# Patient Record
Sex: FEMALE | Race: ASIAN | NOT HISPANIC OR LATINO | Employment: STUDENT | URBAN - METROPOLITAN AREA
[De-identification: names, ages, dates, MRNs, and addresses within clinical notes are randomized per-mention and may not be internally consistent; named-entity substitution may affect disease eponyms.]

---
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
## 2021-04-12 ENCOUNTER — OFFICE VISIT - HEALTHEAST (OUTPATIENT)
Dept: UROLOGY | Facility: CLINIC | Age: 21
End: 2021-04-12

## 2021-04-12 DIAGNOSIS — R10.31 RIGHT LOWER QUADRANT ABDOMINAL PAIN: ICD-10-CM

## 2021-04-12 DIAGNOSIS — N20.1 CALCULUS OF URETER: ICD-10-CM

## 2021-04-12 DIAGNOSIS — N13.2 HYDRONEPHROSIS WITH URINARY OBSTRUCTION DUE TO URETERAL CALCULUS: ICD-10-CM

## 2021-04-12 DIAGNOSIS — N20.0 CALCULUS OF KIDNEY: ICD-10-CM

## 2021-05-10 ENCOUNTER — HOSPITAL ENCOUNTER (OUTPATIENT)
Dept: CT IMAGING | Facility: HOSPITAL | Age: 21
Discharge: HOME | End: 2021-05-10
Attending: PHYSICIAN ASSISTANT

## 2021-05-10 DIAGNOSIS — N20.1 CALCULUS OF URETER: ICD-10-CM

## 2021-05-11 ENCOUNTER — OFFICE VISIT - HEALTHEAST (OUTPATIENT)
Dept: UROLOGY | Facility: CLINIC | Age: 21
End: 2021-05-11

## 2021-05-11 DIAGNOSIS — R10.9 ACUTE RIGHT FLANK PAIN: ICD-10-CM

## 2021-05-11 DIAGNOSIS — N20.1 CALCULUS OF URETER: ICD-10-CM

## 2021-05-12 ENCOUNTER — COMMUNICATION - HEALTHEAST (OUTPATIENT)
Dept: UROLOGY | Facility: CLINIC | Age: 21
End: 2021-05-12

## 2021-05-21 ENCOUNTER — COMMUNICATION - HEALTHEAST (OUTPATIENT)
Dept: UROLOGY | Facility: CLINIC | Age: 21
End: 2021-05-21

## 2021-05-30 ENCOUNTER — RECORDS - HEALTHEAST (OUTPATIENT)
Dept: ADMINISTRATIVE | Facility: CLINIC | Age: 21
End: 2021-05-30

## 2021-06-07 ENCOUNTER — COMMUNICATION - HEALTHEAST (OUTPATIENT)
Dept: UROLOGY | Facility: CLINIC | Age: 21
End: 2021-06-07

## 2021-06-16 NOTE — PATIENT INSTRUCTIONS - HE
Patient Stated Goal: Pass my stone  Symptom Control While Passing A Stone    The goal of [facility] is to let a smaller kidney stone (less than 4 to 5 mm) pass without intervention if possible. Giving your body a chance to clear the stone may take a few hours up to a few weeks.  Keeping you well-informed, safe and fairly comfortable is important.    Drink to thirst  Do not attempt to  flush out  your stone by drinking too much fluid. This does not work and may increase nausea. Drink enough to satisfy your body s thirst. Eating your normal diet is fine.   Medications (that may be suggested or prescribed)    Ibuprofen (Advil or Motrin) Available over the counter  o Take two (200mg) tablets every six hours until the stone passes.  o Prevents spasm of the ureter.    o Decreases pain.      Dramamine* (drowsy version, non-generic formulation) Available over the counter  o Take 50mg at bedtime  o Decreases spasms of the ureter  o Decreases nausea  o Decreases acute pain  o Decreases recurrence of pain for 24 hours  o Will help you sleep  *This medication will cause increase drowsiness, do not drive or operate machinery for 6 hours.      Narcotics (Percocet, Vicodin, Dilaudid) Take as prescribed for severe pain unrelieved by ibuprofen and Dramamine  o Narcotics have significant side effects and only  cover-up  pain. They have no effect on the cause of pain.  o Common side effects  - Confusion, disorientation and sedation - DO NOT DRIVE OR OPERATE MACHINERY WITHIN 24 HOURS  - Nausea - take Dramamine or Zofran or Haldol to help control  - Constipation  - Sleep disturbances      Ondansetron (Zofran) Take as prescribed  o Reserve for severe nausea  o May cause constipation, start over the counter Miralax if needed      Second Line Anti-Nausea Medication: Adding a different anti-nausea medication maybe helpful for persistent nausea.  The combined effect of different types of anti-nausea medications maybe more  effective than either medication by itself, even in higher doses.  o Compazine: Take as prescribed      Information about kidney stones    Crystals can form if chemicals are too concentrated in your urine. If the crystal grows over time, a stone may form. A stone usually isn t painful while it is still in the kidney.    As the stone begins to leave the kidney, you may experience episodes of flank pain as the kidney stone approaches the entrance to the ureter. Some people feel a vague ache in the side.    Kidney stones may fall into the ureter. Some stones are tiny and pass through without causing symptoms. The ureter is a small tube (approximately 1/8 of an inch wide). A kidney stone can get stuck and block the ureter. If this happens, urine backs up and flows back to the kidney. Back pressure on the kidney can cause:  o Severe flank pain radiating to the groin.  o Severe nausea and vomiting.  o The pain can occur in the lower back, side, groin or all three.      When the stone reaches the lower ureter, this can irritate the bladder and sensations of feeling the urge to urinate frequently and urgently may occur.      Once the stone passes out of your ureter and into your bladder, the symptoms of urgency and frequency will often disappear. Sometimes pain will come back for a short period and will not be as severe as before. The passage of the stone from your bladder and out of your body is usually not a problem. The urethra is at least twice as wide as the normal ureter, so the stone doesn t usually block it.    Strain all urine  If you pass the stone, save it. Place it in the container we have provided and bring it to the [facility] within a week of passing it. Your stone will then be sent for analysis which takes about a month.     Signs and symptoms you might experience    Nausea    Decreased appetite    Urinary frequency    Bloody urine     Chills    Fatigue    When to call [facility] or  go to the Emergency Room    Fever with a temperature greater than 100.1    Severe pain    Persistent nausea/vomiting    If the pain worsens or nausea/vomiting is uncontrolled with medications, STOP eating & drinking. You need to have an empty stomach for 8 hours prior to surgery. Call the [facility] immediately at [phone].           Follow-up    Low dose CT scan with doctor visit 1-2 weeks after initial clinic visit per doctor s instructions    Please cancel the CT scan visit if you pass a stone. Reschedule for a one month follow-up with doctor to discuss stone composition and future prevention.    Preventing future stones    Approximately a month after your stone is sent out for analysis, a prevention visit will occur with your provider, to discuss an individualized plan for prevention of new stones and to discuss managing stones that you may still have. Along with the analysis of the kidney stone, blood and urine tests may be indicated to develop this plan. Knowing the type of kidney stones you make, and why, allows the providers at the [facility] to recommend specific ways to prevent them.    Follow-up visits are an important part of monitoring and preventing future re-occurrences.    The [facility] is available for questions or concerns 24 hours a day at [phone]

## 2021-06-16 NOTE — PROGRESS NOTES
Assessment/Plan:    Assessment & Plan   Krystyna was seen today for new problem - ed referred.    Diagnoses and all orders for this visit:    Calculus of ureter  -     Symptom Control While Passing a Stone Education  -     CT Abdomen Pelvis Without Oral Without IV Contrast; Future  -     Patient Stated Goal: Pass my stone    Right lower quadrant abdominal pain    Hydronephrosis with urinary obstruction due to ureteral calculus    Calculus of kidney        Stone Management Plan  [facility] 4/12/2021   Urinary Tract Infection No suspicion of infection   Renal Colic Well controlled symptoms   Renal Failure No suspicion of renal failure   Current CT date 4/11/2021   Right sided stones? Yes   R Number of ureteral stones 1   R GSD of ureteral stones 4   R Location of ureteral stone Proximal   R Number of kidney stones  1   R GSD of kidney stones < 2   R Hydronephrosis Mild   R Stone Event New event   Diagnosis date 4/11/2021   Initial location of primary symptomatic stone Proximal   Initial GSD of primary symptomatic stone 4   R MET status Initiation   R Current Plan MET   MET (No Data)   Left sided stones? No   L Stone Event No current event             PLAN    Will proceed with medical expulsive therapy. Risks and benefits were detailed of medical expulsive therapy including probability of stone passage, recurrent renal colic, and requirement of emergency medical and/or surgical care and further imaging. Patient verbalized understanding. Patient agrees with plan as discussed. She will return in 4 weeks with low dose CT scan.    For symptom control, she was prescribed oxycodone and ondansetron. Over the counter symptom control medications of ibuprofen, Dramamine and Tylenol were recommended.    Phone call duration: 9 minutes  16 minutes spent on the date of the encounter doing chart review, history and exam, documentation and further activities per the note    Loly Menchaca PA-C  [facility]    Subjective:      HPI  Ms. Krystyna Benavides is a 20 y.o. H[place] female who is being evaluated via a billable telephone visit by [facility] following [facility] ER visit for urolithiasis.    She is a first time unidentified composition stone former. She has no identified modifiable stone risk factors. She has identified non-modifiable stone risks including:  early age at first stone and extensive family history.    She was seen in ER yesterday for acute onset left abdominal pain x few hours in duration. The pain was constant, aching with radiation to the back and around the abdomen. It was 8/10 at its worst, Significant associated symptoms at presentation included:  shortness of breath, when pain worsened. She took \"ong medication\" with no relief. Work up was notable for CT reporting an obstructing small right ureteral stone. Labs were negative for acute issues. She was sent home with oxycodone.    She had 8/10 pain this morning. She notes feeling tired. She is asymptomatic at present. She denies symptoms of fever, chills, flank pain, nausea, vomiting, urinary frequency and dysuria.     CT scan from 4/11/21 is personally reviewed and demonstrates a < 4 mm right proximal ureteral stone with mild hydronephrosis. Additional < 2 mm right lower pole renal stone.    Significant labs from presentation include moderate hematuria, no pyuria, negative nitrite, few bacteria, normal WBC, normal creatinine and normal potassium.     ROS   A 12 point comprehensive review of systems is negative except for HPI    No past medical history on file.    No past surgical history on file.    Current Outpatient Medications   Medication Sig Dispense Refill     acetaminophen (TYLENOL) 500 MG tablet Take 2 tablets (1,000 mg total) by mouth 4 (four) times a day for 7 days. 56 tablet 0     dimenhyDRINATE (DRAMAMINE) 50 MG tablet Take 1 tablet (50 mg total) by mouth at bedtime for 7 days. 7 tablet 0     " dimenhyDRINATE (DRAMAMINE) 50 MG tablet Take 1 tablet (50 mg total) by mouth 4 (four) times a day as needed. 28 tablet 0     ibuprofen (ADVIL,MOTRIN) 200 MG tablet Take 2 tablets (400 mg total) by mouth 4 (four) times a day for 7 days. 56 tablet 0     oxyCODONE (ROXICODONE) 5 MG immediate release tablet Every 4-6 hours as needed if pain is not improved with acetaminophen and ibuprofen. 12 tablet 0     No current facility-administered medications for this visit.        No Known Allergies    Social History     Socioeconomic History     Marital status: Single     Spouse name: Not on file     Number of children: Not on file     Years of education: Not on file     Highest education level: Not on file   Occupational History     Not on file   Social Needs     Financial resource strain: Not on file     Food insecurity     Worry: Not on file     Inability: Not on file     Transportation needs     Medical: Not on file     Non-medical: Not on file   Tobacco Use     Smoking status: Not on file   Substance and Sexual Activity     Alcohol use: Not on file     Drug use: Not on file     Sexual activity: Not on file   Lifestyle     Physical activity     Days per week: Not on file     Minutes per session: Not on file     Stress: Not on file   Relationships     Social connections     Talks on phone: Not on file     Gets together: Not on file     Attends Sabianist service: Not on file     Active member of club or organization: Not on file     Attends meetings of clubs or organizations: Not on file     Relationship status: Not on file     Intimate partner violence     Fear of current or ex partner: Not on file     Emotionally abused: Not on file     Physically abused: Not on file     Forced sexual activity: Not on file   Other Topics Concern     Not on file   Social History Narrative     Not on file       No family history on file.    Objective:      No vitals or physical exam obtained due to virtual visit    Labs    Urinalysis POC  (Office):  Nitrite, UA   Date Value Ref Range Status   04/11/2021 Negative Negative Final       Lab Urinalysis:  Blood, UA   Date Value Ref Range Status   04/11/2021 >1.0 mg/dL (!) Negative Final     Nitrite, UA   Date Value Ref Range Status   04/11/2021 Negative Negative Final     Leukocytes, UA   Date Value Ref Range Status   04/11/2021 Negative Negative Final     pH, UA   Date Value Ref Range Status   04/11/2021 6.0 5.0 - 8.0 Final    and Acute Labs   CBC   WBC   Date Value Ref Range Status   04/11/2021 5.6 4.0 - 11.0 thou/uL Final   02/28/2018 11.8 4.5 - 13.0 thou/uL Final     Hemoglobin   Date Value Ref Range Status   04/11/2021 14.0 12.0 - 16.0 g/dL Final   02/28/2018 13.3 12.0 - 16.0 g/dL Final     Platelets   Date Value Ref Range Status   04/11/2021 252 140 - 440 thou/uL Final   02/28/2018 275 140 - 440 thou/uL Final    and Renal Panel  [facility]  Creatinine   Date Value Ref Range Status   04/11/2021 0.64 0.60 - 1.10 mg/dL Final     Potassium   Date Value Ref Range Status   04/11/2021 4.0 3.5 - 5.0 mmol/L Final     Calcium   Date Value Ref Range Status   04/11/2021 9.8 8.5 - 10.5 mg/dL Final

## 2021-06-17 ENCOUNTER — COMMUNICATION - HEALTHEAST (OUTPATIENT)
Dept: UROLOGY | Facility: CLINIC | Age: 21
End: 2021-06-17

## 2021-06-17 NOTE — PROGRESS NOTES
Assessment/Plan:    Assessment & Plan   Krystyna was seen today for met follow up.    Diagnoses and all orders for this visit:    Calculus of ureter  -     Symptom Control While Passing a Stone Education  -     CT Abdomen Pelvis Without Oral Without IV Contrast; Future  -     Patient Stated Goal: Pass my stone    Acute right flank pain    Stone Management Plan  [facility] 4/12/2021   Urinary Tract Infection No suspicion of infection   Renal Colic Well controlled symptoms   Renal Failure No suspicion of renal failure   Current CT date 4/11/2021   Right sided stones? Yes   R Number of ureteral stones 1   R GSD of ureteral stones 4   R Location of ureteral stone Proximal   R Number of kidney stones  1   R GSD of kidney stones < 2   R Hydronephrosis Mild   R Stone Event New event   Diagnosis date 4/11/2021   Initial location of primary symptomatic stone Proximal   Initial GSD of primary symptomatic stone 4   R MET status Initiation   R Current Plan MET   MET (No Data)   Left sided stones? No   L Stone Event No current event         PLAN    19 yo H[place] F first time stone former with progression of right ureteral stone, now x 2.     She will continue to attempt to pass stone and will return in 4 weeks with further imaging.     Phone call duration: 7 minutes  12 minutes spent on the date of the encounter doing chart review, history and exam, documentation and further activities per the note    Loly Menchaca PA-C  [facility]    Subjective:      HPI  Ms. Krystyna Benavides is a 20 y.o. [place] female who is being evaluated via a billable telephone visit by [facility] for medical expulsive therapy follow up.     On last encounter, her 4 mm stone was in right proximal ureter with mild hydronephrosis. She has had no unanticipated events.    She has had mild, intermittent pain in the right flank and abdomen. She is asymptomatic at present. She denies symptoms of  fever, chills, flank pain, nausea, vomiting, urinary frequency and dysuria.     New CT scan was personally reviewed and demonstrates progression of 4 mm stone into mid ureter. Additionally, previous 2 mm right lower pole stone has migrated into ureter and sitting ~ 2 cm proximal to pre-existing ureteral stone. No hydronephrosis.     ROS   Review of systems is negative except for HPI.    No past medical history on file.    No past surgical history on file.    No current outpatient medications on file.     No current facility-administered medications for this visit.        No Known Allergies    Social History     Socioeconomic History     Marital status: Single     Spouse name: Not on file     Number of children: Not on file     Years of education: Not on file     Highest education level: Not on file   Occupational History     Not on file   Social Needs     Financial resource strain: Not on file     Food insecurity     Worry: Not on file     Inability: Not on file     Transportation needs     Medical: Not on file     Non-medical: Not on file   Tobacco Use     Smoking status: Not on file   Substance and Sexual Activity     Alcohol use: Not on file     Drug use: Not on file     Sexual activity: Not on file   Lifestyle     Physical activity     Days per week: Not on file     Minutes per session: Not on file     Stress: Not on file   Relationships     Social connections     Talks on phone: Not on file     Gets together: Not on file     Attends Confucianist service: Not on file     Active member of club or organization: Not on file     Attends meetings of clubs or organizations: Not on file     Relationship status: Not on file     Intimate partner violence     Fear of current or ex partner: Not on file     Emotionally abused: Not on file     Physically abused: Not on file     Forced sexual activity: Not on file   Other Topics Concern     Not on file   Social History Narrative     Not on file       No family history on  file.    Objective:      No vitals or physical exam obtained due to virtual visit

## 2021-06-17 NOTE — PROGRESS NOTES
Patient roomed via telephone for a virtual visit.  She is being seen for a 4 weeks ureteral stone follow up with a CT scan.  Patient confirmed she is in the [facility] at the time of this appointment.

## 2021-06-17 NOTE — PATIENT INSTRUCTIONS - HE
Patient Stated Goal: Pass my stone  Symptom Control While Passing A Stone    The goal of [facility] is to let a smaller kidney stone (less than 4 to 5 mm) pass without intervention if possible. Giving your body a chance to clear the stone may take a few hours up to a few weeks.  Keeping you well-informed, safe and fairly comfortable is important.    Drink to thirst  Do not attempt to  flush out  your stone by drinking too much fluid. This does not work and may increase nausea. Drink enough to satisfy your body s thirst. Eating your normal diet is fine.   Medications (that may be suggested or prescribed)    Ibuprofen (Advil or Motrin) Available over the counter  o Take two (200mg) tablets every six hours until the stone passes.  o Prevents spasm of the ureter.    o Decreases pain.      Dramamine* (drowsy version, non-generic formulation) Available over the counter  o Take 50mg at bedtime  o Decreases spasms of the ureter  o Decreases nausea  o Decreases acute pain  o Decreases recurrence of pain for 24 hours  o Will help you sleep  *This medication will cause increase drowsiness, do not drive or operate machinery for 6 hours.      Narcotics (Percocet, Vicodin, Dilaudid) Take as prescribed for severe pain unrelieved by ibuprofen and Dramamine  o Narcotics have significant side effects and only  cover-up  pain. They have no effect on the cause of pain.  o Common side effects  - Confusion, disorientation and sedation - DO NOT DRIVE OR OPERATE MACHINERY WITHIN 24 HOURS  - Nausea - take Dramamine or Zofran or Haldol to help control  - Constipation  - Sleep disturbances      Ondansetron (Zofran) Take as prescribed  o Reserve for severe nausea  o May cause constipation, start over the counter Miralax if needed      Second Line Anti-Nausea Medication: Adding a different anti-nausea medication maybe helpful for persistent nausea.  The combined effect of different types of anti-nausea medications maybe more  effective than either medication by itself, even in higher doses.  o Compazine: Take as prescribed      Information about kidney stones    Crystals can form if chemicals are too concentrated in your urine. If the crystal grows over time, a stone may form. A stone usually isn t painful while it is still in the kidney.    As the stone begins to leave the kidney, you may experience episodes of flank pain as the kidney stone approaches the entrance to the ureter. Some people feel a vague ache in the side.    Kidney stones may fall into the ureter. Some stones are tiny and pass through without causing symptoms. The ureter is a small tube (approximately 1/8 of an inch wide). A kidney stone can get stuck and block the ureter. If this happens, urine backs up and flows back to the kidney. Back pressure on the kidney can cause:  o Severe flank pain radiating to the groin.  o Severe nausea and vomiting.  o The pain can occur in the lower back, side, groin or all three.      When the stone reaches the lower ureter, this can irritate the bladder and sensations of feeling the urge to urinate frequently and urgently may occur.      Once the stone passes out of your ureter and into your bladder, the symptoms of urgency and frequency will often disappear. Sometimes pain will come back for a short period and will not be as severe as before. The passage of the stone from your bladder and out of your body is usually not a problem. The urethra is at least twice as wide as the normal ureter, so the stone doesn t usually block it.    Strain all urine  If you pass the stone, save it. Place it in the container we have provided and bring it to the [facility] within a week of passing it. Your stone will then be sent for analysis which takes about a month.     Signs and symptoms you might experience    Nausea    Decreased appetite    Urinary frequency    Bloody urine     Chills    Fatigue    When to call [facility] or  go to the Emergency Room    Fever with a temperature greater than 100.1    Severe pain    Persistent nausea/vomiting    If the pain worsens or nausea/vomiting is uncontrolled with medications, STOP eating & drinking. You need to have an empty stomach for 8 hours prior to surgery. Call the [facility] immediately at [phone].           Follow-up    Low dose CT scan with doctor visit 1-2 weeks after initial clinic visit per doctor s instructions    Please cancel the CT scan visit if you pass a stone. Reschedule for a one month follow-up with doctor to discuss stone composition and future prevention.    Preventing future stones    Approximately a month after your stone is sent out for analysis, a prevention visit will occur with your provider, to discuss an individualized plan for prevention of new stones and to discuss managing stones that you may still have. Along with the analysis of the kidney stone, blood and urine tests may be indicated to develop this plan. Knowing the type of kidney stones you make, and why, allows the providers at the [facility] to recommend specific ways to prevent them.    Follow-up visits are an important part of monitoring and preventing future re-occurrences.    The [facility] is available for questions or concerns 24 hours a day at [phone]

## 2021-06-18 ENCOUNTER — COMMUNICATION - HEALTHEAST (OUTPATIENT)
Dept: UROLOGY | Facility: CLINIC | Age: 21
End: 2021-06-18

## 2021-06-26 NOTE — TELEPHONE ENCOUNTER
PT CALLED BACK TO TELL US THAT SHE PASSED HER STONE AND IS NOT INTERESTED AT THIS TIME TO F/U. TOLD THE PT THAT SHOULD SHE HAVE ISSUES IN THE FUTURE TO CALL.

## 2022-03-30 ENCOUNTER — OFFICE VISIT (OUTPATIENT)
Dept: FAMILY MEDICINE | Facility: CLINIC | Age: 22
End: 2022-03-30
Payer: COMMERCIAL

## 2022-03-30 VITALS
OXYGEN SATURATION: 97 % | BODY MASS INDEX: 32.82 KG/M2 | TEMPERATURE: 97.9 F | SYSTOLIC BLOOD PRESSURE: 114 MMHG | WEIGHT: 172.3 LBS | HEART RATE: 72 BPM | DIASTOLIC BLOOD PRESSURE: 75 MMHG | RESPIRATION RATE: 20 BRPM

## 2022-03-30 DIAGNOSIS — H10.9 BACTERIAL CONJUNCTIVITIS OF LEFT EYE: ICD-10-CM

## 2022-03-30 DIAGNOSIS — F32.9 MAJOR DEPRESSIVE DISORDER WITH CURRENT ACTIVE EPISODE, UNSPECIFIED DEPRESSION EPISODE SEVERITY, UNSPECIFIED WHETHER RECURRENT: Primary | ICD-10-CM

## 2022-03-30 PROCEDURE — 99203 OFFICE O/P NEW LOW 30 MIN: CPT | Performed by: FAMILY MEDICINE

## 2022-03-30 RX ORDER — POLYMYXIN B SULFATE AND TRIMETHOPRIM 1; 10000 MG/ML; [USP'U]/ML
1-2 SOLUTION OPHTHALMIC EVERY 4 HOURS
Qty: 10 ML | Refills: 0 | Status: SHIPPED | OUTPATIENT
Start: 2022-03-30

## 2022-03-30 NOTE — PROGRESS NOTES
Assessment:       Bacterial conjunctivitis of left eye  - trimethoprim-polymyxin b (POLYTRIM) 77383-7.1 UNIT/ML-% ophthalmic solution  Dispense: 10 mL; Refill: 0    Major depressive disorder with current active episode, unspecified depression episode severity, unspecified whether recurrent  - Adult Mental [facility]  Referral         Plan:     Symptoms consistent with bacterial conjunctivitis of the left eye.  Polytrim ophthalmic drops prescribed.  Recommend warm compresses.  Follow-up if symptoms getting worse or not improving.    Patient seems to be struggling with symptoms consistent with major depressive disorder.  Mental health referral placed for counseling.  She does have an appointment in 2 days to establish care with a new PCP as well as follow-up of her mental health advised her to keep this appointment.  May need medication management but will defer to primary care for this.  No current thoughts of self-harm.  Patient is agreeable with this plan.    MEDICATIONS:   Orders Placed This Encounter   Medications     trimethoprim-polymyxin b (POLYTRIM) 29894-1.1 UNIT/ML-% ophthalmic solution     Sig: Place 1-2 drops Into the left eye every 4 hours     Dispense:  10 mL     Refill:  0         Subjective:       21 year old female presents for evaluation of a couple day history of itchy irritated and red left eye with purulent drainage and some crusting at the base of her eyelashes.  For the past couple of years she has had issues with bilateral watery eyes.  She denies any eye pain.  No significant changes in her visual acuity.  Denies foreign body sensation or trauma.    Patient is also been feeling like her moods have been very low for the past year or so.  She finds herself unmotivated to get things done and has a hard time focusing at work.  She wants to sleep all day.  She has fleeting thoughts of self-harm but currently feels okay in this regard.  Her mother  last August and she has really struggled  with the grief of this.  Is an appointment in 2 days to discuss these issues with primary care.  She is wondering about a referral to therapy.        Patient Active Problem List   Diagnosis     Major depressive disorder with current active episode, unspecified depression episode severity, unspecified whether recurrent       No past medical history on file.    Past Surgical History:   Procedure Laterality Date     NO HISTORY OF SURGERY         Current Outpatient Medications   Medication     ibuprofen (ADVIL/MOTRIN) 600 MG tablet     trimethoprim-polymyxin b (POLYTRIM) 31145-1.1 UNIT/ML-% ophthalmic solution     Acetaminophen (TYLENOL PO)     cetirizine (ZYRTEC) 10 MG tablet     No current facility-administered medications for this visit.       No Known Allergies    Family History   Problem Relation Age of Onset     Diabetes Mother      Hypertension Mother      Asthma Father      Family History Negative Other        Social History     Socioeconomic History     Marital status: Single     Spouse name: None     Number of children: None     Years of education: None     Highest education level: None   Occupational History     None   Tobacco Use     Smoking status: Current Some Day Smoker     Smokeless tobacco: Current User     Tobacco comment: smokes vape   Substance and Sexual Activity     Alcohol use: None     Drug use: None     Sexual activity: Never     Partners: Male     Birth control/protection: Pill   Other Topics Concern     None   Social History Narrative     None     Social Determinants of Health     Financial Resource Strain: Not on file   Food Insecurity: Not on file   Transportation Needs: Not on file   Physical Activity: Not on file   Stress: Not on file   Social Connections: Not on file   Intimate Partner Violence: Not on file   Housing Stability: Not on file         Review of Systems  Pertinent items are noted in HPI.      Objective:     /75 (BP Location: Right arm, Patient Position: Sitting, Cuff  Size: Adult Regular)   Pulse 72   Temp 97.9  F (36.6  C) (Oral)   Resp 20   Wt 78.2 kg (172 lb 4.8 oz)   LMP 10/13/2021 (Approximate)   SpO2 97%   Breastfeeding No   BMI 32.82 kg/m       General appearance: Alert, patient tearful.  Affect somewhat flat.  Eyes: Patient with some yellowish drainage coming from the left eye.  Conjunctiva is erythematous and injected.  No foreign body seen.  She has some crusting noted at the base of her eyelashes.  Right eye is unaffected.  Lungs: clear to auscultation bilaterally  Heart: Regular rate and rhythm          This note has been dictated using voice recognition software. Any grammatical or context distortions are unintentional and inherent to the software

## 2022-03-30 NOTE — PATIENT INSTRUCTIONS
Patient Education     Bacterial Conjunctivitis    You have an infection in the membranes covering the white part of the eye. This part of the eye is called the conjunctiva. The infection is called conjunctivitis. The most common symptoms of conjunctivitis include a thick, pus-like discharge from the eye, swollen eyelids, redness, eyelids sticking together upon awakening, and a gritty or scratchy feeling in the eye. Your infection was caused by bacteria. It may be treated with medicine. With treatment, the infection takes about 7 to 10 days to resolve.   Home care    Use prescribed antibiotic eye drops or ointment as directed to treat the infection.    Apply a warm compress (towel soaked in warm water) to the affected eye 3 to 4 times a day. Do this just before applying medicine to the eye.    Use a warm, wet cloth to wipe away crusting of the eyelids in the morning. This is caused by mucus drainage during the night. You may also use saline irrigating solution or artificial tears to rinse away mucus in the eye. Do not put a patch over the eye.    Wash your hands before and after touching the infected eye. This is to prevent spreading the infection to the other eye, and to other people. Don't share your towels or washcloths with others.    You may use acetaminophen or ibuprofen to control pain, unless another medicine was prescribed. Talk with your healthcare provider before using these medicines if you have chronic liver or kidney disease. Also talk with your provider if you have ever had a stomach ulcer or digestive bleeding.    Don't wear contact lenses until your eyes have healed and all symptoms are gone.    Follow-up care  Follow up with your healthcare provider, or as advised.  When to seek medical advice  Call your healthcare provider right away if any of these occur:    Worsening vision    Increasing pain in the eye    Increasing swelling or redness of the eyelid    Redness spreading around the  eye  Wero last reviewed this educational content on 2020-2021 [employer]. All rights reserved. This information is not intended as a substitute for professional medical care. Always follow your healthcare professional's instructions.           Patient Education     Know the Symptoms of Depression  Everyone feels down at times. The blues are a natural part of life. But an unhappy period that s intense or lasts for more than a few weeks is different. It can be a sign of depression. Depression is a serious illness. It's not a sign of weakness. It's not a character flaw. And it's not something you can just snap out of. Most people with depression need treatment to get better. Depression can disrupt the lives of family and friends. If you know someone who may be depressed, find out what you can do to help.    Symptoms of depression  People who are depressed may:    Feel unhappy, sad, blue, down, or miserable almost every day    Feel helpless, hopeless, or worthless    Lose interest in hobbies, friends, and activities that used to give pleasure    Not sleep well or sleep too much    Gain or lose weight    Feel low on energy or always tired    Have a hard time focusing or making decisions    Lose interest in sex    Have physical symptoms, such as stomachaches, headaches, or backaches  Know the serious signs  Never ignore a person's comments about suicide. Or behaviors that can lead to self-harm. Warning signs for suicide include:    Threats or talk of suicide. Talk of harming themselves or others.    Saying things such as  I won t be a problem much longer  or  Nothing matters.     Giving away their things. Or making a will or  plans.    Buying a gun or other weapon.    Sudden, unexplained cheerfulness or calm after a period of depression.  If you see any of these signs, get help right away. Call a healthcare provider, mental health clinic, or suicide hotline. Ask what you should do. In  an emergency, call 911.  Resources:    National Institutes of Mental Qtdqvn154-910-5872ach.[facility].nih.gov    National [place] on Mental Gcekelh261-978-1571yxc.rosita.org     Mental Health Crkpvkf424-861-2462qar.[facility].org    National Suicide Bshxapd595-377-1420 (800-SUICIDE)    National Suicide Prevention Pgeqrpgt218-201-4723 (793-876-TGKU)www.suicidepreventionlifeline.org    Wero last reviewed this educational content on 4/1/2019 2000-2021 [employer]. All rights reserved. This information is not intended as a substitute for professional medical care. Always follow your healthcare professional's instructions.

## 2022-03-30 NOTE — LETTER
March 30, 2022      Krystyna Benavides  [address] [place] [address]        To Whom It May Concern:    Krystyna Benavides  was seen on 3/30/2022.  Please excuse her 3/30/2022 due to  illness.        Sincerely,        Dariana Flores MD

## 2022-03-31 ENCOUNTER — TELEPHONE (OUTPATIENT)
Dept: BEHAVIORAL HEALTH | Facility: CLINIC | Age: 22
End: 2022-03-31
Payer: COMMERCIAL

## 2022-03-31 NOTE — TELEPHONE ENCOUNTER
Reached patient, said cannot talk and will call back later.     Barbara Wan  Transition Clinic Coordinator  Date and Time: 03/31/22 3:15 PM      ----- Message from Pal Fernando sent at 3/31/2022 11:52 AM CDT -----  Regarding: transition clinic referral  Transition Clinic Referral   [place] Only   Limited [place] Availability    Type of Referral:      __X__Therap  _____Therapy & Medication (Psychiatry next level of care appointment needs to be scheduled)  _____Medication Only (Psychiatry next level of care appointment needs to be scheduled)  _____Diagnostic Assessment Only      Referring Provider Name: Pal Fernando    Clinician completing the assessment.     Referring Provider: OTHER: OP Intake    If known, referring provider Phone Number: [phone]    Reason for Transition Clinic Referral: Pt has a referral for therapy. Please help bridge the wait until they can be seen. Thanks!    Next Level of Care Patient Will Be Transitioned To: Nat Vergara [facility] Department: [facility] ADDICTION     Start Date for Next Level of Care Therapy (Required): 8/29/22  Provider Nat Vergara [facility]   [facility]     Start Date for Next Level of Care Medication (Required): N/A  Provider  Location   Psychiatry cannot see patients who do not have active medical insurance    What Would Be Helpful from the Transition Clinic: Pt has a referral for therapy. Please help bridge the wait until they can be seen. Thanks! Pt might prefer openings after 4pm if possible.      Needs: NO    Does Patient Have Access to Technology: yes    Patient E-mail Address: [email or web address]    Current Patient Phone Number: [phone];     Clinician Gender Preference (if applicable): NO    Pal Fernando

## 2022-04-01 ENCOUNTER — TELEPHONE (OUTPATIENT)
Dept: BEHAVIORAL HEALTH | Facility: CLINIC | Age: 22
End: 2022-04-01

## 2022-04-01 ENCOUNTER — OFFICE VISIT (OUTPATIENT)
Dept: FAMILY MEDICINE | Facility: CLINIC | Age: 22
End: 2022-04-01
Payer: COMMERCIAL

## 2022-04-01 VITALS
OXYGEN SATURATION: 97 % | WEIGHT: 174 LBS | RESPIRATION RATE: 18 BRPM | SYSTOLIC BLOOD PRESSURE: 98 MMHG | DIASTOLIC BLOOD PRESSURE: 58 MMHG | TEMPERATURE: 98.5 F | HEART RATE: 86 BPM | BODY MASS INDEX: 32.85 KG/M2 | HEIGHT: 61 IN

## 2022-04-01 DIAGNOSIS — N92.6 IRREGULAR PERIODS: ICD-10-CM

## 2022-04-01 DIAGNOSIS — Z00.00 ROUTINE GENERAL MEDICAL EXAMINATION AT A HEALTH CARE FACILITY: Primary | ICD-10-CM

## 2022-04-01 LAB
ALBUMIN SERPL-MCNC: 3.9 G/DL (ref 3.5–5)
ALP SERPL-CCNC: 90 U/L (ref 45–120)
ALT SERPL W P-5'-P-CCNC: 23 U/L (ref 0–45)
ANION GAP SERPL CALCULATED.3IONS-SCNC: 10 MMOL/L (ref 5–18)
AST SERPL W P-5'-P-CCNC: 16 U/L (ref 0–40)
BILIRUB SERPL-MCNC: 0.2 MG/DL (ref 0–1)
BUN SERPL-MCNC: 12 MG/DL (ref 8–22)
CALCIUM SERPL-MCNC: 10.9 MG/DL (ref 8.5–10.5)
CHLORIDE BLD-SCNC: 107 MMOL/L (ref 98–107)
CHOLEST SERPL-MCNC: 235 MG/DL
CO2 SERPL-SCNC: 26 MMOL/L (ref 22–31)
CREAT SERPL-MCNC: 0.82 MG/DL (ref 0.6–1.1)
ERYTHROCYTE [DISTWIDTH] IN BLOOD BY AUTOMATED COUNT: 11.8 % (ref 10–15)
FASTING STATUS PATIENT QL REPORTED: NO
GFR SERPL CREATININE-BSD FRML MDRD: >90 ML/MIN/1.73M2
GLUCOSE BLD-MCNC: 103 MG/DL (ref 70–125)
HCT VFR BLD AUTO: 42.4 % (ref 35–47)
HDLC SERPL-MCNC: 52 MG/DL
HGB BLD-MCNC: 13.8 G/DL (ref 11.7–15.7)
LDLC SERPL CALC-MCNC: 118 MG/DL
MCH RBC QN AUTO: 29.7 PG (ref 26.5–33)
MCHC RBC AUTO-ENTMCNC: 32.5 G/DL (ref 31.5–36.5)
MCV RBC AUTO: 91 FL (ref 78–100)
PLATELET # BLD AUTO: 304 10E3/UL (ref 150–450)
POTASSIUM BLD-SCNC: 4.7 MMOL/L (ref 3.5–5)
PROT SERPL-MCNC: 7.5 G/DL (ref 6–8)
RBC # BLD AUTO: 4.64 10E6/UL (ref 3.8–5.2)
SODIUM SERPL-SCNC: 143 MMOL/L (ref 136–145)
TRIGL SERPL-MCNC: 323 MG/DL
TSH SERPL DL<=0.005 MIU/L-ACNC: 2.1 UIU/ML (ref 0.3–5)
WBC # BLD AUTO: 12.4 10E3/UL (ref 4–11)

## 2022-04-01 PROCEDURE — 80053 COMPREHEN METABOLIC PANEL: CPT | Performed by: FAMILY MEDICINE

## 2022-04-01 PROCEDURE — 99395 PREV VISIT EST AGE 18-39: CPT | Performed by: FAMILY MEDICINE

## 2022-04-01 PROCEDURE — 80061 LIPID PANEL: CPT | Performed by: FAMILY MEDICINE

## 2022-04-01 PROCEDURE — 84443 ASSAY THYROID STIM HORMONE: CPT | Performed by: FAMILY MEDICINE

## 2022-04-01 PROCEDURE — 36415 COLL VENOUS BLD VENIPUNCTURE: CPT | Performed by: FAMILY MEDICINE

## 2022-04-01 PROCEDURE — 99213 OFFICE O/P EST LOW 20 MIN: CPT | Mod: 25 | Performed by: FAMILY MEDICINE

## 2022-04-01 PROCEDURE — 85027 COMPLETE CBC AUTOMATED: CPT | Performed by: FAMILY MEDICINE

## 2022-04-01 ASSESSMENT — PATIENT HEALTH QUESTIONNAIRE - PHQ9
10. IF YOU CHECKED OFF ANY PROBLEMS, HOW DIFFICULT HAVE THESE PROBLEMS MADE IT FOR YOU TO DO YOUR WORK, TAKE CARE OF THINGS AT HOME, OR GET ALONG WITH OTHER PEOPLE: EXTREMELY DIFFICULT
SUM OF ALL RESPONSES TO PHQ QUESTIONS 1-9: 15
SUM OF ALL RESPONSES TO PHQ QUESTIONS 1-9: 15

## 2022-04-01 NOTE — PROGRESS NOTES
SUBJECTIVE:   CC: Krystyna Benavides is an 21 year old woman who presents for preventive health visit.       Patient has been advised of split billing requirements and indicates understanding: Yes     Healthy Habits:     Getting at least 3 servings of Calcium per day:  NO    Bi-annual eye exam:  Yes    Dental care twice a year:  NO    Sleep apnea or symptoms of sleep apnea:  Daytime drowsiness    Diet:  Regular (no restrictions)    Frequency of exercise:  4-5 days/week    Duration of exercise:  Greater than 60 minutes    Taking medications regularly:  Yes    Medication side effects:  None    PHQ-2 Total Score: 3    Additional concerns today:  Yes        PROBLEMS TO ADD ON...  Period is not regular for quite a while,   I have it about every several months, my nipples hurt and I have a strange smelling dischrage down there.    Patient states that once in a while she will get a very hsrp pain in her chest area and pause what she is doing and focus on breathing.       Today's PHQ-2 Score:   PHQ-2 ( 1999 [employer]) 4/1/2022   Q1: Little interest or pleasure in doing things 2   Q2: Feeling down, depressed or hopeless 2   PHQ-2 Score 4   Q1: Little interest or pleasure in doing things More than half the days   Q2: Feeling down, depressed or hopeless More than half the days   PHQ-2 Score 4       Abuse: Current or Past (Physical, Sexual or Emotional) - No  Do you feel safe in your environment? Yes    Have you ever done Advance Care Planning? (For example, a Health Directive, POLST, or a discussion with a medical provider or your loved ones about your wishes): No, advance care planning information given to patient to review.  Advanced care planning was discussed at today's visit.    Social History     Tobacco Use     Smoking status: Current Some Day Smoker     Smokeless tobacco: Current User     Tobacco comment: smokes vape   Substance Use Topics     Alcohol use: Not on file       Alcohol Use 4/1/2022   Prescreen: >3 drinks/day or >7  "drinks/week? No     Reviewed orders with patient.  Reviewed health maintenance and updated orders accordingly - Yes  Lab work is in process  Labs reviewed in EPIC         Reviewed and updated as needed this visit by clinical staff   Tobacco  Allergies  Meds              Reviewed and updated as needed this visit by Provider                     Review of Systems       OBJECTIVE:   BP 98/58   Pulse 86   Temp 98.5  F (36.9  C)   Resp 18   Ht 1.543 m (5' 0.75\")   Wt 78.9 kg (174 lb)   SpO2 97%   BMI 33.15 kg/m    Physical Exam  GENERAL: healthy, alert and no distress  EYES: Eyes grossly normal to inspection, PERRL and conjunctivae and sclerae normal  HENT: normal cephalic/atraumatic, nose and mouth without ulcers or lesions, oropharynx clear and oral mucous membranes moist  NECK: no adenopathy, no asymmetry, masses, or scars and thyroid normal to palpation  RESP: lungs clear to auscultation - no rales, rhonchi or wheezes  CV: regular rate and rhythm, normal S1 S2, no S3 or S4, no murmur, click or rub, no peripheral edema and peripheral pulses strong  ABDOMEN: soft, nontender, no hepatosplenomegaly, no masses and bowel sounds normal   (female): deferred to Gyn.   MS: no gross musculoskeletal defects noted, no edema  SKIN: no suspicious lesions or rashes  NEURO: Normal strength and tone, mentation intact and speech normal  PSYCH: mentation appears normal, affect normal/bright    Diagnostic Test Results:  Labs reviewed in Epic    ASSESSMENT/PLAN:     Problem List Items Addressed This Visit     None      Visit Diagnoses     Routine general medical examination at a health care facility    -  Primary    Relevant Orders    TSH with free T4 reflex (Completed)    Comprehensive metabolic panel (BMP + Alb, Alk Phos, ALT, AST, Total. Bili, TP) (Completed)    Lipid panel reflex to direct LDL Fasting (Completed)    CBC with platelets (Completed)    See result notes       Irregular periods        Relevant Orders    Ob/Gyn " "Referral          Patient has been advised of split billing requirements and indicates understanding: Yes    COUNSELING:  Reviewed preventive health counseling, as reflected in patient instructions       Regular exercise       Healthy diet/nutrition    Estimated body mass index is 33.15 kg/m  as calculated from the following:    Height as of this encounter: 1.543 m (5' 0.75\").    Weight as of this encounter: 78.9 kg (174 lb).      She reports that she has been smoking. She uses smokeless tobacco.  Tobacco Cessation Action Plan:   Self help information given to patient      Counseling Resources:  ATP IV Guidelines  Pooled Cohorts Equation Calculator  Breast Cancer Risk Calculator  BRCA-Related Cancer Risk Assessment: FHS-7 Tool  FRAX Risk Assessment  ICSI Preventive Guidelines  Dietary Guidelines for Americans, 2010  [employer]'s MyPlate  ASA Prophylaxis  Lung CA Screening    Hannah Donis MD  [facility]  Answers for HPI/ROS submitted by the patient on 4/1/2022  If you checked off any problems, how difficult have these problems made it for you to do your work, take care of things at home, or get along with other people?: Extremely difficult  PHQ9 TOTAL SCORE: 15      "

## 2022-04-01 NOTE — TELEPHONE ENCOUNTER
Transition Clinic Note       2nd attempt: Follow up check in for scheduling needs with pt who was referred from PCP. No answer on the line, voicemail indicating TC call back and 20lines message with resources    Thank you for your interest in [facility]. Currently, patients are experiencing long waits for intake when referred within [facility]. Please know that you may contact your insurance carrier member services to learn more about scheduling in network therapy. Your insurance company will have lists of in network therapists that are not within [facility] and may have more immediate availability.     Get Care started with an ongoing therapist by calling to schedule your intake at one of the following clinics:  [facility]  [phone]  [facility] [phone]  [facility] [phone]  [facility] [phone]  [facility]  [phone]   [facility] Solution in [facility] [phone]    You may contact the Transition Clinic for brief short term support with your mental health goals. Call [phone] for more information.             ----- Message from Pal Fernando sent at 3/31/2022 11:52 AM CDT -----  Regarding: transition clinic referral  Transition Clinic Referral   [place] Only   Limited [place] Availability    Type of Referral:      __X__Therap  _____Therapy & Medication (Psychiatry next level of care appointment needs to be scheduled)  _____Medication Only (Psychiatry next level of care appointment needs to be scheduled)  _____Diagnostic Assessment Only      Referring Provider Name: Pal Fernando    Clinician completing the assessment.     Referring Provider: OTHER: OP Intake    If known, referring provider Phone Number: [phone]    Reason for Transition Clinic Referral: Pt has a referral for therapy. Please help bridge the wait until they can be seen. Thanks!    Next Level of Care Patient Will Be Transitioned To: Ursula  Nat Zuleta [facility]     Start Date for Next Level of Care Therapy (Required): 8/29/22  Provider Nat Vergara [facility]   Location Department: [facility] ADDICTION     Start Date for Next Level of Care Medication (Required): N/A  Provider  Location   Psychiatry cannot see patients who do not have active medical insurance    What Would Be Helpful from the Transition Clinic: Pt has a referral for therapy. Please help bridge the wait until they can be seen. Thanks! Pt might prefer openings after 4pm if possible.      Needs: NO    Does Patient Have Access to Technology: yes    Patient E-mail Address: [email or web address]    Current Patient Phone Number: [phone];     Clinician Gender Preference (if applicable): NO    Pal Fernando

## 2022-04-01 NOTE — TELEPHONE ENCOUNTER
Pt called the queue to set up therapy. Appoint[facility] for [facility]18 [facility] 5:30pm [facility]  Care Coordinator  4.1

## 2022-04-03 ENCOUNTER — TELEPHONE (OUTPATIENT)
Dept: BEHAVIORAL HEALTH | Facility: CLINIC | Age: 22
End: 2022-04-03
Payer: COMMERCIAL

## 2022-04-03 NOTE — TELEPHONE ENCOUNTER
Appointment with TC was already scheduled on 4.1. Writer notified referral source and done'd referral.     [facility]  [facility] Coordinator  4.3    ----- Message from Pal Fernando sent at 3/31/2022 11:52 AM CDT -----  Regarding: transition clinic referral  Transition Clinic Referral   [place] Only   Limited [place] Availability    Type of Referral:      __X__Therap  _____Therapy & Medication (Psychiatry next level of care appointment needs to be scheduled)  _____Medication Only (Psychiatry next level of care appointment needs to be scheduled)  _____Diagnostic Assessment Only      Referring Provider Name: Pal Fernando    Clinician completing the assessment.     Referring Provider: OTHER: OP Intake    If known, referring provider Phone Number: [phone]    Reason for Transition Clinic Referral: Pt has a referral for therapy. Please help bridge the wait until they can be seen. Thanks!    Next Level of Care Patient Will Be Transitioned To: [facility]     Start Date for Next Level of Care Therapy (Required): 8/29/22  Provider aNt [facility]   [facility]     Start Date for Next Level of Care Medication (Required): N/A  Provider  Location   Psychiatry cannot see patients who do not have active medical insurance    What Would Be Helpful from the Transition Clinic: Pt has a referral for therapy. Please help bridge the wait until they can be seen. Thanks! Pt might prefer openings after 4pm if possible.      Needs: NO    Does Patient Have Access to Technology: yes    Patient E-mail Address: [email or web address]    Current Patient Phone Number: [phone];     Clinician Gender Preference (if applicable): NO    Pal Fernando

## 2022-04-13 ENCOUNTER — TELEPHONE (OUTPATIENT)
Dept: BEHAVIORAL HEALTH | Facility: CLINIC | Age: 22
End: 2022-04-13
Payer: COMMERCIAL

## 2022-04-13 NOTE — TELEPHONE ENCOUNTER
Writer left  requesting pt to call back to reschedule 4/18 Monday appt to different day.    [facility]  Care Coordinator  4.13

## 2022-04-20 ENCOUNTER — VIRTUAL VISIT (OUTPATIENT)
Dept: BEHAVIORAL HEALTH | Facility: CLINIC | Age: 22
End: 2022-04-20
Attending: PSYCHIATRY & NEUROLOGY
Payer: COMMERCIAL

## 2022-04-20 DIAGNOSIS — F32.A DEPRESSION: Primary | ICD-10-CM

## 2022-04-20 NOTE — PROGRESS NOTES
[facility]   Mental Health & Addiction Services     Progress Note - Initial Visit    Patient  Name:  Krystyna Benavides Date: 22          Service Type: Individual     Visit Start Time: 5:30 PM  Visit End Time: 6:33 PM    Visit #: 1    Attendees: Client attended alone    Service Modality:  Video Visit:      Provider verified identity through the following two step process.  Patient provided:  Patient  and Patient address    Telemedicine Visit: The patient's condition can be safely assessed and treated via synchronous audio and visual telemedicine encounter.      Reason for Telemedicine Visit: Services only offered telehealth    Originating Site (Patient Location): Patient's home    Distant Site (Provider Location): [facility] HEALTH & ADDICTION SERVICES    Consent:  The patient/guardian has verbally consented to: the potential risks and benefits of telemedicine (video visit) versus in person care; bill my insurance or make self-payment for services provided; and responsibility for payment of non-covered services.     Patient would like the video invitation sent by:  My Chart    Mode of Communication:  Video Conference via Amwell    As the provider I attest to compliance with applicable laws and regulations related to telemedicine.       DATA:   Interactive Complexity: No   Crisis: No     Presenting Concerns/  Current Stressors:   Pt is a physically healthy 21 year old female who is single.   Pt mother passed away Aug 2021 complications of failing health. Pt was living with mom at the time. Currently lives with 3 older brothers, dad and dogs. Pt is one of 10 siblings and 6 of them living out of the house. Pt has a niece who works in mental health and struggles with sxs of depression, recommended that pt could talk to someone and get help.     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 Pt expresses satisfaction with work although she has  "trouble with memory and recalling steps of tasks.     Low motivation, not wanting to go to work, wanting to stay in bed, forgetful and unfocused, the sxs had been present in the past. Pt notes she is withdrawn from friends and family, isolating, existential thoughts, asking \"why am I alive, how would others feel if I pass away, does anyone even care\". Pt reports having friends she no longer sees, feeling angry with a friend who disregarded her needs recently and feels that the friendship is at an end. This friend was formerly her gym partner, Pt reports she did at one point hold a pair of scissors to her wrist on one occasion last month, reports she was in a daze, felt like she \"woke up\" with this in her hand and the dog started barking and stopped her. Pt has had thoughts of \"what if I cut myself or what if I stab myself\" while cooking, without having acted on this in the past. Pt reports that her brother and her dad started to check in on her and make sure that she was okay, \"softened my heart and helped me to feel more like I matter\". Pt reports that her suicide thoughts are not present currently as depressive sxs worsen, feeling she does not have the energy to care for self or to harm self.     [facility] Follow-up to PHQ 4/1/2022 4/1/2022 4/19/2022   PHQ-9 9. Suicide Ideation past 2 weeks More than half the days Several days Several days   Thoughts of suicide or self harm in past 2 weeks - No No   Thoughts of suicide or self harm in past 2 weeks No - No   PHQ-9 Safety concerns? - No No   PHQ-9 Safety concerns? No - No     ASSESSMENT:  Mental Status Assessment:  Appearance:   Appropriate   Eye Contact:   Good   Psychomotor Behavior: calm   Attitude:   Cooperative  Interested Attentive  Orientation:   All  Speech   Rate / Production: Normal/ Responsive   Volume:  Normal   Mood:    Euthymic  Affect:    Constricted   Thought Content:  Clear   Thought Form:  Coherent  Logical   Insight:    Good   .phq    Safety Issues and " Plan for Safety and Risk Management:   [place] Suicide Severity Rating Scale (Short Version)No flowsheet data found.  Patient denies current fears or concerns for personal safety.  Patient reports the following current or recent suicidal ideation or behaviors: pt reports having one incident of considering suicide gesture one month ago, described above. pt denies ongoing thoughts of suicide since that time, reports she sought  of a trusted family memeber and was able to reach out for help through medical staff. .  Patient denies current or recent homicidal ideation or behaviors.  Patient denies current or recent self injurious behavior or ideation.  Patient denies other safety concerns.  A safety and risk management plan has been developed including: Patient consented to co-developed safety plan on today.  Safety and risk management plan was reviewed.   Patient agreed to use safety plan should any safety concerns arise.  A copy was made available to the patient.  Patient reports there are no firearms in the house.     Diagnostic Criteria:  Major Depressive Disorder  CRITERIA (A-C) REPRESENT A MAJOR DEPRESSIVE EPISODE - SELECT THESE CRITERIA  A) Single episode - symptoms have been present during the same 2-week period and represent a change from previous functioning 5 or more symptoms (required for diagnosis)   - Depressed mood. Note: In children and adolescents, can be irritable mood.     - Diminished interest or pleasure in all, or almost all, activities.    - Increased sleep.    - Psychomotor activity retardation.    - Fatigue or loss of energy.    - Feelings of worthlessness or inappropriate and excessive guilt.    - Diminished ability to think or concentrate, or indecisiveness.    - Recurrent thoughts of death (not just fear of dying), recurrent suicidal ideation without a specific plan, or a suicide attempt or a specific plan for committing suicide.   B) The symptoms cause clinically significant distress  or impairment in social, occupational, or other important areas of functioning  C) The episode is not attributable to the physiological effects of a substance or to another medical condition      DSM5 Diagnoses: (Sustained by DSM5 Criteria Listed Above)  Diagnoses: 296.33 (F33.2) Major Depressive Disorder, Recurrent Episode, Severe _  Psychosocial & Contextual Factors: last of 10 siblings,   WHODAS 2.0 (12 item):   WHODAS 2.0 Total Score 4/19/2022 4/19/2022   Total Score 32 32   Total Score MyChart - 32     Intervention:   Psychodynamic- Patient processed internal experiences , Completed through review of safety issues and safety interventions, Completed Safety plan and Educated on treatment planning and started identifying goals and interventions for treatment plan  Collateral Reports Completed:  Not Applicable      PLAN: (Homework, other):  1. Provider will continue Diagnostic Assessment.  Patient was given the following to do until next session:        2. Provider recommended the following referrals: individual therapy with community provider, list provided. Consider medication for help with sxs, talk with PCP.  Consider IOP or PHP if symptoms are keeping you from your normal daily activities    3.  Suicide Risk and Safety Concerns were assessed for Krystyna Benavides.    Patient meets the following risk assessment and triage: When the patient identifies the following:  Suicidal Ideation Without method, intent, plan, or behavior (Yes to C-SSRS Suicidal Ideation #1 or #2 and No to #3,4,5)    The following is recommended:   Complete/Review/Update Safety Plan    Safety Plan:  Adult Short Safety Plan:   Name: Krystyna Benavides  YOB: 2000  Date: April 20, 2022   My primary care provider: Basilia Corbin  My primary care clinic: [facility]  My prescriber: n/a  Other care team support:  therapist   My Triggers:   - feeling hopelessness for future  - sad about being left out  - wondering what I am living  for  - thinking about harming myself  - feeling like my mind is wandering   Additional People, Places, and Things that I can access for support:   Sisters  Brothers  Dad  DOGS         What is important to me and makes life worth living: dogs, family .         GREEN    Good Control  1. I feel good  2. No suicidal thoughts   3. Can work, sleep and play      Action Steps  1. Self-care: balanced meals, exercising, sleep practices, etc.  2. Take your medications as prescribed.  3. Continue meetings with therapist and prescriber.  4.  Do the healthy things that I enjoy.  5. Cleaning and organizing           YELLOW  Getting Worse  I have ANY of these:  1. I do not feel good  2. Difficulty Concentrating  3. Sleep is changing  4. Increase/Change in my thoughts to hurt self and/or others, but I can still manage and not act on it.   5. Not taking care of self.  6. Spacing out/wandering mind             Action Steps (in addition to the above):  1. Inform your therapist and psychiatric prescriber/PCP.  2. Keep taking your medications as prescribed.    3. Turn to people you can ask for help.  4. Use internal coping strategies -see below.  5. Create safe environment: notify friends/family of increase in symptoms  6. *get out of the bedroom           RED  Get Help  If I have ANY of these:  1. Current and uncontrollable thoughts and/or behaviors to hurt self and/or others.   2. Ask rosmery to help me know what to do   Actions to manage my safety  1. Contact your emergency person Rosmery Vera   2. Call my crisis team- [facility] [phone] [facility]  3. Or Call 911 or go to the emergency room right away  4.  Get your family to help you        My Internal Coping Strategies include the following:  take a bath, belly breathing, arts and crafts, color, play with my pet, exercise and use my coping skills      Safety Concerns  How To Identify Situations That Make Your Mental Health Worse:  Triggers  are things that make your mental health worse.  Look at the list below to help you find your triggers and what you can do about them.     1. Identify Early Warning Signs:    Sometimes symptoms return, even when people do their best to stay well. Symptoms can develop over a short period of time with little or no warning, but most of the time they emerge gradually over several weeks.  Early warning signs are changes that people experience when a relapse is starting. Some early warning signs are common and others are not as common.   Common Early Warning Signs:    Trouble sleeping -either too much or too little sleep, Feeling depressed or low, Feeling irritable, Feeling like not being around other people, Trouble concentrating and Urges to harm self     2. Identify action steps to take when warning signs are noticed:    Taking Action- It is important to take action if you are experiencing early warning signs of a relapse.  The faster you act, the more likely it is that you can avoid a full relapse.  It is helpful to identify several specific ways to cope with symptoms.      The following is my list of symptoms and coping strategies that I can use when they are present:    Symptom Coping Strategies   Anxiety -Talk with someone in your support system and let him or her know how you are feeling.  -Use relaxation techniques such as deep breathing or imagery.  -Use positive affirmations to counteract negative self-talk such as  I am learning to let go of worry.    Depression - Schedule your day; include activities you have to do and activities you enjoy doing.  - Get some exercise - walk, run, bike, or swim.  - Give yourself credit for even the smallest things you get done.   Sleep Difficulties   - Go to sleep at the same time every day.  - Do something relaxing before bed, such as drinking herbal tea or listening to music.  - Avoid having discussions about upsetting topics before going to bed.   Delusions   - Distract  yourself from the disturbing thought by doing something that requires your attention such as a puzzle.  - Check out your beliefs by talking to someone you trust.    Hallucinations   - Use headphones to listen to music.  - Tell voices to  stop  or say to yourself,  I am safe.   - Ignore the hallucinations as much as possible; focus on other things.   Concentration Difficulties - Minimize distractions so there is only one thing for you to focus on at a time.    - Ask the person you are having a conversation with to slow down or repeat things you are unsure of.                   Juli Comer, [facility]  April 20, 2022

## 2022-04-28 ENCOUNTER — VIRTUAL VISIT (OUTPATIENT)
Dept: BEHAVIORAL HEALTH | Facility: CLINIC | Age: 22
End: 2022-04-28
Attending: PSYCHIATRY & NEUROLOGY
Payer: COMMERCIAL

## 2022-04-28 DIAGNOSIS — F43.23 ADJUSTMENT DISORDER WITH MIXED ANXIETY AND DEPRESSED MOOD: Primary | ICD-10-CM

## 2022-04-28 NOTE — PROGRESS NOTES
[facility]                                    Progress Note    Patient Name: Krystyna Benavides  Date: 04/28/22          Service Type: Individual      Session Start Time: 6:25 PM  Session End Time: 7:08 PM     Session Length: 43    Session #: 2    Attendees: Client attended alone    Service Modality:  Video Visit:      Provider verified identity through the following two step process.  Patient provided:  Patient is known previously to provider    Telemedicine Visit: The patient's condition can be safely assessed and treated via synchronous audio and visual telemedicine encounter.      Reason for Telemedicine Visit: Services only offered telehealth    Originating Site (Patient Location): Patient's home    Distant Site (Provider Location): [facility] HEALTH & ADDICTION SERVICES    Consent:  The patient/guardian has verbally consented to: the potential risks and benefits of telemedicine (video visit) versus in person care; bill my insurance or make self-payment for services provided; and responsibility for payment of non-covered services.     Patient would like the video invitation sent by:  My Chart    Mode of Communication:  Video Conference via Amwell    As the provider I attest to compliance with applicable laws and regulations related to telemedicine.    DATA  Interactive Complexity: No  Crisis: No        Progress Since Last Session (Related to Symptoms / Goals / Homework):   Symptoms: Improving improved depressive sxs, anxiety sxs continue to be present    Homework: Achieved / completed to satisfaction      Episode of Care Goals: Satisfactory progress - ACTION (Actively working towards change); Intervened by reinforcing change plan / affirming steps taken     Current / Ongoing Stressors and Concerns:   pt denies having ongoing thoughts of suicide, but having dark moments seeking meaning, and noticing feeling more grounded since the therapy session and notes having some periods of  "mind going blank, spacing out and unable to track time or concentrate, feeling that time is passing while she is 'zoned out\". Pt notes she is engaging in more social time and seeking out loved ones, distracting self with playing with dogs and children, calling close friend and going to the gym. Pt notices that she is more focused after gym time.     Treatment Objective(s) Addressed in This Session:   use distraction each time intrusive worry surfaces  Increase interest, engagement, and pleasure in doing things  Decrease frequency and intensity of feeling down, depressed, hopeless  Structure intentional time for wellness activities     Intervention:   DBT: grounding and mindfulness coaching  psychoeducaiton regarding sxs and solutions     Assessments completed prior to visit:  The following assessments were completed by patient for this visit:  PHQ9:   PHQ-9 SCORE 4/1/2022 4/1/2022 4/19/2022   PHQ-9 Total Score MyChart - 15 (Moderately severe depression) 19 (Moderately severe depression)   PHQ-9 Total Score 15 11 19     GAD7:   HOLGER-7 SCORE 4/19/2022   Total Score 17 (severe anxiety)   Total Score 17         ASSESSMENT: Current Emotional / Mental Status (status of significant symptoms):   Risk status (Self / Other harm or suicidal ideation)   Patient denies current fears or concerns for personal safety.   Patient denies current or recent suicidal ideation or behaviors.   Patient denies current or recent homicidal ideation or behaviors.   Patient denies current or recent self injurious behavior or ideation.   Patient denies other safety concerns.   Patient reports there has been no change in risk factors since their last session.     Patient reports there has been no change in protective factors since their last session.     A safety and risk management plan has been developed including: Patient consented to co-developed safety plan on 4/21.  Safety and risk management plan was reviewed.   Patient agreed to use safety " plan should any safety concerns arise.  A copy was made available to the patient.     Appearance:   Appropriate    Eye Contact:   Good    Psychomotor Behavior: Normal    Attitude:   Cooperative  Interested Attentive   Orientation:   All   Speech    Rate / Production: Normal/ Responsive    Volume:  Normal    Mood:    Euthymic   Affect:    Appropriate  Subdued    Thought Content:  Clear    Thought Form:  Coherent  Goal Directed  Logical    Insight:    Good      Medication Review:   No current psychiatric medications prescribed     Medication Compliance:   NA     Changes in Health Issues:   None reported     Chemical Use Review:   Substance Use: Chemical use reviewed, no active concerns identified      Tobacco Use: No current tobacco use.      Diagnosis:  1. Adjustment disorder with mixed anxiety and depressed mood        Collateral Reports Completed:   Not Applicable    PLAN: (Patient Tasks / Therapist Tasks / Other)  Scheduled for additional check in session in 5 weeks per pt request. Pt to continue with structured activities to improve sense of wellness and focus. Pt continues to follow safety plan advice.         HOMERO ToSW

## 2022-06-14 ENCOUNTER — OFFICE VISIT (OUTPATIENT)
Dept: OBGYN | Facility: CLINIC | Age: 22
End: 2022-06-14
Payer: COMMERCIAL

## 2022-06-14 VITALS
DIASTOLIC BLOOD PRESSURE: 70 MMHG | WEIGHT: 173 LBS | HEART RATE: 72 BPM | BODY MASS INDEX: 32.66 KG/M2 | HEIGHT: 61 IN | SYSTOLIC BLOOD PRESSURE: 106 MMHG

## 2022-06-14 DIAGNOSIS — E66.9 OBESITY WITHOUT SERIOUS COMORBIDITY, UNSPECIFIED CLASSIFICATION, UNSPECIFIED OBESITY TYPE: ICD-10-CM

## 2022-06-14 DIAGNOSIS — N91.5 OLIGOMENORRHEA, UNSPECIFIED TYPE: Primary | ICD-10-CM

## 2022-06-14 DIAGNOSIS — N92.6 IRREGULAR PERIODS: ICD-10-CM

## 2022-06-14 LAB
ESTRADIOL SERPL-MCNC: 33 PG/ML
FSH SERPL-ACNC: 5.5 MIU/ML
HBA1C MFR BLD: 6.6 % (ref 0–5.6)
LH SERPL-ACNC: 7.2 MIU/ML

## 2022-06-14 PROCEDURE — 82670 ASSAY OF TOTAL ESTRADIOL: CPT | Performed by: OBSTETRICS & GYNECOLOGY

## 2022-06-14 PROCEDURE — 83002 ASSAY OF GONADOTROPIN (LH): CPT | Performed by: OBSTETRICS & GYNECOLOGY

## 2022-06-14 PROCEDURE — 83036 HEMOGLOBIN GLYCOSYLATED A1C: CPT | Performed by: OBSTETRICS & GYNECOLOGY

## 2022-06-14 PROCEDURE — 84270 ASSAY OF SEX HORMONE GLOBUL: CPT | Performed by: OBSTETRICS & GYNECOLOGY

## 2022-06-14 PROCEDURE — 99203 OFFICE O/P NEW LOW 30 MIN: CPT | Performed by: OBSTETRICS & GYNECOLOGY

## 2022-06-14 PROCEDURE — 84403 ASSAY OF TOTAL TESTOSTERONE: CPT | Performed by: OBSTETRICS & GYNECOLOGY

## 2022-06-14 PROCEDURE — 36415 COLL VENOUS BLD VENIPUNCTURE: CPT | Performed by: OBSTETRICS & GYNECOLOGY

## 2022-06-14 PROCEDURE — 83001 ASSAY OF GONADOTROPIN (FSH): CPT | Performed by: OBSTETRICS & GYNECOLOGY

## 2022-06-15 LAB — SHBG SERPL-SCNC: 8 NMOL/L (ref 30–135)

## 2022-06-17 LAB
TESTOST FREE SERPL-MCNC: 0.83 NG/DL
TESTOST SERPL-MCNC: 26 NG/DL (ref 8–60)

## 2022-06-24 NOTE — PROGRESS NOTES
CC: Krystyna Benavides is here secondary to irregular menses.    HPI: The pt is a 21 year old SHmongF P0 who presents with period changes.  She is seen with a professional [place]  on the phone.  Her periods were normal until about 2011 or 2012.  After that they have become much more irregular.  She can go for up to 2 years without a period.  She had a period in Oct of 2021 then no bleeding until a period on April 8 that lasted 7 days.  She then had bleeding again for 4 days that started on April 27.  With that bleed she also had cramping which isn't usual for her.  The bleeding in Oct and the first one in April were heavier than what she is used to.  She had a normal TSH of 2.1 on 4/1/22.  Her HgB then was 13.8.  She had a CT in both April and May of 2021, both of which showed normal pelvic anatomy.    No past medical history on file.    Past Surgical History:   Procedure Laterality Date     NO HISTORY OF SURGERY         Patient's   Family History   Problem Relation Age of Onset     Diabetes Mother      Hypertension Mother      Asthma Father      Family History Negative Other        Patient   Social History     Socioeconomic History     Marital status: Single     Spouse name: None     Number of children: None     Years of education: None     Highest education level: None   Tobacco Use     Smoking status: Current Some Day Smoker     Smokeless tobacco: Current User     Tobacco comment: smokes vape   Vaping Use     Vaping Use: Some days     Substances: Flavoring     Devices: Disposable   Substance and Sexual Activity     Sexual activity: Never     Partners: Male     Birth control/protection: Pill       Outpatient Medications Prior to Visit   Medication Sig Dispense Refill     trimethoprim-polymyxin b (POLYTRIM) 09466-1.1 UNIT/ML-% ophthalmic solution Place 1-2 drops Into the left eye every 4 hours 10 mL 0     Acetaminophen (TYLENOL PO) Take 325 mg by mouth  (Patient not taking: Reported on 6/14/2022)       cetirizine  "(ZYRTEC) 10 MG tablet Take 1 tablet (10 mg) by mouth every evening (Patient not taking: Reported on 6/14/2022) 90 tablet 1     ibuprofen (ADVIL/MOTRIN) 600 MG tablet Take 1 tablet (600 mg) by mouth every 6 hours as needed for moderate pain (Patient not taking: Reported on 6/14/2022) 45 tablet 0     No facility-administered medications prior to visit.       Patient has No Known Allergies.    ROS:  12 part ROS is negative aside from those symptoms in the HPI    PE:  /70 (BP Location: Right arm, Patient Position: Sitting, Cuff Size: Adult Regular)   Pulse 72   Ht 1.537 m (5' 0.5\")   Wt 78.5 kg (173 lb)   LMP 05/27/2022 (Exact Date)           Body mass index is 33.23 kg/m .    General: obese AF, NAD  Psych: normal mood  Neuro: CN I-XII grossly intact  MS: normal gait    Assessment: 21 year old SHmongF P0 with oligomenorrhea.    Plan: Natural history of causes for oligomenorrhea discussed with the patient.  Labs were ordered to evaluate for diabetes and PCOS.  We discussed possible options depending on the results.  Follow up will be done once the results are back.  Questions were answered to the best of my ability.    20 minutes spent on the date of the encounter doing chart review, review of test results, patient visit and documentation   "

## 2022-08-29 ENCOUNTER — OFFICE VISIT (OUTPATIENT)
Dept: FAMILY MEDICINE | Facility: CLINIC | Age: 22
End: 2022-08-29
Payer: COMMERCIAL

## 2022-08-29 ENCOUNTER — DOCUMENTATION ONLY (OUTPATIENT)
Dept: PSYCHOLOGY | Facility: CLINIC | Age: 22
End: 2022-08-29

## 2022-08-29 VITALS
TEMPERATURE: 97.8 F | WEIGHT: 171 LBS | DIASTOLIC BLOOD PRESSURE: 75 MMHG | OXYGEN SATURATION: 97 % | RESPIRATION RATE: 20 BRPM | BODY MASS INDEX: 32.85 KG/M2 | HEART RATE: 64 BPM | SYSTOLIC BLOOD PRESSURE: 122 MMHG

## 2022-08-29 DIAGNOSIS — J30.2 SEASONAL ALLERGIC RHINITIS, UNSPECIFIED TRIGGER: Primary | ICD-10-CM

## 2022-08-29 DIAGNOSIS — H10.13 ALLERGIC CONJUNCTIVITIS, BILATERAL: ICD-10-CM

## 2022-08-29 PROCEDURE — 99213 OFFICE O/P EST LOW 20 MIN: CPT | Performed by: NURSE PRACTITIONER

## 2022-08-29 RX ORDER — CETIRIZINE HYDROCHLORIDE 10 MG/1
10 TABLET ORAL DAILY
Qty: 30 TABLET | Refills: 3 | Status: SHIPPED | OUTPATIENT
Start: 2022-08-29 | End: 2022-09-28

## 2022-08-29 ASSESSMENT — ENCOUNTER SYMPTOMS
PHOTOPHOBIA: 1
FATIGUE: 1

## 2022-08-29 NOTE — PATIENT INSTRUCTIONS
Take cetirizine/zyrtec for allergies and eyedrops for eye symptoms.  Can take both or just one or the other.

## 2022-08-29 NOTE — PROGRESS NOTES
Patient had an initial/intake appointment scheduled for today at 10 AM.  This writer logged into the video appointment and waited until 10:15 AM as well as reaching out to patient twice by phone.  Patient is writer more unable to connect.  A voice message was left providing patient with the number to reschedule her initial/intake appointment if she would like and letting her know that the follow-up appointment scheduled for September 13 and September 27 would be canceled.

## 2022-08-29 NOTE — PROGRESS NOTES
Assessment & Plan     Seasonal allergic rhinitis, unspecified trigger    - cetirizine (ZYRTEC) 10 MG tablet  Dispense: 30 tablet; Refill: 3  - ketotifen (ZADITOR) 0.025 % ophthalmic solution  Dispense: 10 mL; Refill: 0    Allergic conjunctivitis, bilateral    - cetirizine (ZYRTEC) 10 MG tablet  Dispense: 30 tablet; Refill: 3  - ketotifen (ZADITOR) 0.025 % ophthalmic solution  Dispense: 10 mL; Refill: 0     Patient with a history of Fall allergies with eye itching, eye redness, stringy white discharge at times, and congestion.    Also think she may be allergic to dogs because she will get intermittent eye symptoms associated with getting a dog.    Zyrtec, ketotifen eyedrops discussed.  Cool packs if needed for eye itching and swelling.  May use either medication or both depending on symptoms present and efficacy.  Recheck in a few days if not much better.              Return in about 1 week (around 9/5/2022) for If no better.    Lo Faith, [facility]    Rehana Greenwood is a 21 year old female who presents to clinic today for the following health issues:  Chief Complaint   Patient presents with     Eye Problem     Both eyes x yesterday. Redness, super itchy, burning sensation, some swelling, watery, congestion, headaches, dizziness and light sensitive, little blurred vision.      HPI    Hx fall allergies.     Redness, itching, watery bilaterally.      Congestion.        Review of Systems   Constitutional: Positive for fatigue.   Eyes: Positive for photophobia. Negative for visual disturbance.           Objective    /75 (BP Location: Right arm, Patient Position: Sitting, Cuff Size: Adult Regular)   Pulse 64   Temp 97.8  F (36.6  C) (Oral)   Resp 20   Wt 77.6 kg (171 lb)   LMP  (LMP Unknown)   SpO2 97%   BMI 32.85 kg/m    Physical Exam  Constitutional:       General: She is not in acute distress.     Appearance: She is well-developed.   HENT:      Nose: Congestion  present.   Eyes:      General:         Right eye: No discharge.         Left eye: No discharge.      Conjunctiva/sclera:      Right eye: Right conjunctiva is injected. No exudate.     Left eye: Left conjunctiva is injected. No exudate.     Comments: + Watery eyes bilaterally   Pulmonary:      Effort: Pulmonary effort is normal.   Musculoskeletal:         General: Normal range of motion.   Skin:     General: Skin is warm and dry.      Capillary Refill: Capillary refill takes less than 2 seconds.   Neurological:      Mental Status: She is alert and oriented to person, place, and time.   Psychiatric:         Mood and Affect: Mood normal.         Behavior: Behavior normal.         Thought Content: Thought content normal.         Judgment: Judgment normal.

## 2022-08-29 NOTE — LETTER
[address] [address]  Phone: [phone]  Fax: [phone]    August 29, 2022        Maiko Vang [address] [place] [address]          To whom it may concern:    RE: Krystyna Benavides    Patient was seen and treated today at our clinic and missed work for urgent care visit.      Please contact me for questions or concerns.      Sincerely,        Lo Faith, CNP

## 2022-11-16 ENCOUNTER — OFFICE VISIT (OUTPATIENT)
Dept: FAMILY MEDICINE | Facility: CLINIC | Age: 22
End: 2022-11-16
Payer: COMMERCIAL

## 2022-11-16 VITALS
TEMPERATURE: 99.9 F | DIASTOLIC BLOOD PRESSURE: 70 MMHG | SYSTOLIC BLOOD PRESSURE: 104 MMHG | HEART RATE: 87 BPM | OXYGEN SATURATION: 98 % | BODY MASS INDEX: 32.17 KG/M2 | RESPIRATION RATE: 22 BRPM | WEIGHT: 167.5 LBS

## 2022-11-16 DIAGNOSIS — R68.83 CHILLS: ICD-10-CM

## 2022-11-16 DIAGNOSIS — R50.9 FEVER, UNSPECIFIED FEVER CAUSE: Primary | ICD-10-CM

## 2022-11-16 LAB
FLUAV AG SPEC QL IA: POSITIVE
FLUBV AG SPEC QL IA: NEGATIVE
SARS-COV-2 RNA RESP QL NAA+PROBE: NEGATIVE

## 2022-11-16 PROCEDURE — U0005 INFEC AGEN DETEC AMPLI PROBE: HCPCS | Performed by: FAMILY MEDICINE

## 2022-11-16 PROCEDURE — 99213 OFFICE O/P EST LOW 20 MIN: CPT | Mod: CS | Performed by: FAMILY MEDICINE

## 2022-11-16 PROCEDURE — U0003 INFECTIOUS AGENT DETECTION BY NUCLEIC ACID (DNA OR RNA); SEVERE ACUTE RESPIRATORY SYNDROME CORONAVIRUS 2 (SARS-COV-2) (CORONAVIRUS DISEASE [COVID-19]), AMPLIFIED PROBE TECHNIQUE, MAKING USE OF HIGH THROUGHPUT TECHNOLOGIES AS DESCRIBED BY CMS-2020-01-R: HCPCS | Performed by: FAMILY MEDICINE

## 2022-11-16 PROCEDURE — 87804 INFLUENZA ASSAY W/OPTIC: CPT | Performed by: FAMILY MEDICINE

## 2022-11-16 NOTE — PROGRESS NOTES
Assessment & Plan     Chills  Pos flu A  - Influenza A & B Antigen - Clinic Collect    Fever, unspecified fever cause    - Symptomatic; Yes; 11/14/2022 COVID-19 Virus (Coronavirus) by PCR Nose         {Provider  Link to [facility] Help Grid :239078}    No follow-ups on file.    Eddie Guzmán MD  [facility] KAI Greenwood is a 22 year old female who presents to clinic today for the following health issues:  Chief Complaint   Patient presents with     Dizziness     X Tuesday morning. Lightheaded, off balance. No blurred vision or light sensitivity.     Nausea     X 2-3 months. Pt states has not vomit, just nausea.     Cough     X Monday. Did not check temp but warm to touch, chills and body aches x Monday night. No throat pain, sore throat. At home covid yesterday negative     Letter for School/Work     Work note for today's visit.     HPI    Cough and dizzy  Fever and chills  Weak and lightheaded  Started Monday night.        Review of Systems        Objective    /70 (BP Location: Right arm, Patient Position: Sitting, Cuff Size: Adult Regular)   Pulse 87   Temp 99.9  F (37.7  C) (Oral)   Resp 22   Wt 76 kg (167 lb 8 oz)   LMP 10/06/2022 (Exact Date)   SpO2 98%   BMI 32.17 kg/m    Physical Exam  Vitals and nursing note reviewed.   Constitutional:       Appearance: Normal appearance.   HENT:      Right Ear: Tympanic membrane normal.      Left Ear: Tympanic membrane normal.      Mouth/Throat:      Mouth: Mucous membranes are moist.   Eyes:      Pupils: Pupils are equal, round, and reactive to light.   Cardiovascular:      Rate and Rhythm: Normal rate and regular rhythm.      Pulses: Normal pulses.      Heart sounds: Normal heart sounds.   Pulmonary:      Effort: Pulmonary effort is normal.      Breath sounds: Normal breath sounds.   Musculoskeletal:      Cervical back: Neck supple.   Neurological:      Mental Status: She is alert.

## 2022-11-16 NOTE — LETTER
November 16, 2022      To Whom It May Concern:      Krystyna Benavides was seen in today and has Influenza.      Sincerely,        Eddie Guzmán MD

## 2022-11-21 ENCOUNTER — HEALTH MAINTENANCE LETTER (OUTPATIENT)
Age: 22
End: 2022-11-21

## 2023-06-02 ENCOUNTER — HEALTH MAINTENANCE LETTER (OUTPATIENT)
Age: 23
End: 2023-06-02

## 2024-03-12 ENCOUNTER — OFFICE VISIT (OUTPATIENT)
Dept: FAMILY MEDICINE | Facility: CLINIC | Age: 24
End: 2024-03-12
Payer: COMMERCIAL

## 2024-03-12 VITALS
HEIGHT: 61 IN | OXYGEN SATURATION: 98 % | WEIGHT: 164.8 LBS | TEMPERATURE: 98.8 F | SYSTOLIC BLOOD PRESSURE: 110 MMHG | HEART RATE: 72 BPM | RESPIRATION RATE: 16 BRPM | BODY MASS INDEX: 31.11 KG/M2 | DIASTOLIC BLOOD PRESSURE: 60 MMHG

## 2024-03-12 DIAGNOSIS — H60.543 ECZEMA OF BOTH EXTERNAL EARS: Primary | ICD-10-CM

## 2024-03-12 DIAGNOSIS — H04.123 DRY EYES: ICD-10-CM

## 2024-03-12 PROCEDURE — 99213 OFFICE O/P EST LOW 20 MIN: CPT | Performed by: FAMILY MEDICINE

## 2024-03-12 RX ORDER — MUPIROCIN 20 MG/G
OINTMENT TOPICAL 3 TIMES DAILY
Qty: 30 G | Refills: 0 | Status: SHIPPED | OUTPATIENT
Start: 2024-03-12

## 2024-03-12 RX ORDER — DESONIDE 0.5 MG/G
CREAM TOPICAL 2 TIMES DAILY
Qty: 15 G | Refills: 0 | Status: SHIPPED | OUTPATIENT
Start: 2024-03-12

## 2024-03-12 RX ORDER — HYDROCORTISONE AND ACETIC ACID 20.75; 10.375 MG/ML; MG/ML
3 SOLUTION AURICULAR (OTIC) 2 TIMES DAILY
Qty: 10 ML | Refills: 0 | Status: SHIPPED | OUTPATIENT
Start: 2024-03-12

## 2024-03-12 ASSESSMENT — PATIENT HEALTH QUESTIONNAIRE - PHQ9
SUM OF ALL RESPONSES TO PHQ QUESTIONS 1-9: 0
10. IF YOU CHECKED OFF ANY PROBLEMS, HOW DIFFICULT HAVE THESE PROBLEMS MADE IT FOR YOU TO DO YOUR WORK, TAKE CARE OF THINGS AT HOME, OR GET ALONG WITH OTHER PEOPLE: NOT DIFFICULT AT ALL
SUM OF ALL RESPONSES TO PHQ QUESTIONS 1-9: 0

## 2024-03-12 NOTE — PATIENT INSTRUCTIONS
I think you have a dermatitis (skin inflammation) of your EXTERNAL ear, but because of the over-scratching/peeling of skin you may have developed a bacterial infection on top of it. To treat, you will apply topical antibiotic cream and topical steroid cream. This should clear it up.   I think your eye problems are dry eyes. Sometimes the eyes/tear ducts over compensate and produce more tears/slimy tears. We want you to use artificial tears 4x/day and warm compress at least once a day. You can use a warm washcloth. This will help produce more healthier tears.

## 2024-03-12 NOTE — PROGRESS NOTES
"  Assessment & Plan     Eczema of both external ears  Chronic, untreated. Likely has developed secondary bacterial infection as well. Will treat with topical low potency steroids and mupirocin.   - desonide (DESOWEN) 0.05 % external cream  Dispense: 15 g; Refill: 0  - mupirocin (BACTROBAN) 2 % external ointment  Dispense: 30 g; Refill: 0    Dry eyes  Chronic, untreated. Eye symptoms consistent with dry eyes. Treat with artifical tears 4x/day and warm compress daily. Return if develop conjunctivitis symptoms.    - dextran 70-hypromellose (TEARS NATURALE FREE PF) 0.1-0.3 % ophthalmic solution  Dispense: 35 each; Refill: 11        Prescription drug management    Nicotine/Tobacco Cessation  She reports that she has been smoking vaping device. She uses smokeless tobacco.  Nicotine/Tobacco Cessation Plan  Defer to next visit      BMI  Estimated body mass index is 31.66 kg/m  as calculated from the following:    Height as of this encounter: 1.537 m (5' 0.5\").    Weight as of this encounter: 74.8 kg (164 lb 12.8 oz).   Weight management plan: Defer to next visit       See Patient Instructions    Rehana Greenwood is a 23 year old, presenting for the following health issues:  Ear Problem (It's itchy, dry, and painful on the top on both ears. It been going on over an year. Pt use Vaseline and hydroperoxide. Pt thought it was going to go away but it never did. ) and Eye Problem (Starting to come back. Pt use clear eye drops for the eye problems. Pt states having like yellow eye booger it's mainly on the left eye but just a little on the right eye. Sometimes the eye boogers makes it blurry for the pt to see if she doesn't remove it out of her eyes.)        3/12/2024     4:51 PM   Additional Questions   Roomed by José LUZ     History of Present Illness       Reason for visit:  My ear  Symptom onset:  More than a month  Symptoms include:  My ear is itchy and theres ear wax ( LIKE) around it. When I scratch it and i peel it. In my " "eat there liquid coming out.  Symptom intensity:  Severe  Symptom progression:  Worsening  Had these symptoms before:  No  What makes it worse:  Matt  What makes it better:  None    She eats 0-1 servings of fruits and vegetables daily.She consumes 2 sweetened beverage(s) daily.She exercises with enough effort to increase her heart rate 9 or less minutes per day.  She exercises with enough effort to increase her heart rate 3 or less days per week.   She is taking medications regularly.     Having issues with her right ear.   Peeling, itching,   Has used hydrogen peroxide on it with a Q-tip  Has been present for a year   No hearing loss   Has pain   Has been getting worse     Eye  Has watery eyes   Will get some blurry vision with it  Will squeeze her tear duct, get yellow gunk out and then has improvement   Cooling comfort eye drops help,   Symptoms occur daily, improvement with drops   Both eyes, but worse on left   Has been going on for a long time (maybe since 2019)  No pain in eyes, redness or itchiness           Objective    /60   Pulse 72   Temp 98.8  F (37.1  C) (Oral)   Resp 16   Ht 1.537 m (5' 0.5\")   Wt 74.8 kg (164 lb 12.8 oz)   LMP 11/07/2023 (Exact Date)   SpO2 98%   BMI 31.66 kg/m    Body mass index is 31.66 kg/m .  Physical Exam   GENERAL: alert and no distress  EYES: Eyes grossly normal to inspection, PERRL and conjunctivae and sclerae normal, clear drainage expressed from left eye   HENT: normal cephalic/atraumatic and both ears: normal: no effusions, no erythema, normal landmarks and  external ears with erythematous, scaly, excoriated and maceration    MS: no gross musculoskeletal defects noted, no edema          Delicia Richardson DNP student     ----- Services Performed by a STUDENT in Presence of ATTENDING Physician-------      Signed Electronically by: MELIDA FOX DO    "

## 2024-06-22 ENCOUNTER — HEALTH MAINTENANCE LETTER (OUTPATIENT)
Age: 24
End: 2024-06-22

## 2024-11-05 ASSESSMENT — PATIENT HEALTH QUESTIONNAIRE - PHQ9: SUM OF ALL RESPONSES TO PHQ QUESTIONS 1-9: 11

## 2025-06-24 ENCOUNTER — RESULTS FOLLOW-UP (OUTPATIENT)
Dept: FAMILY MEDICINE | Facility: CLINIC | Age: 25
End: 2025-06-24

## 2025-06-24 ENCOUNTER — OFFICE VISIT (OUTPATIENT)
Dept: INTERNAL MEDICINE | Facility: CLINIC | Age: 25
End: 2025-06-24
Payer: COMMERCIAL

## 2025-06-24 VITALS
WEIGHT: 160.7 LBS | RESPIRATION RATE: 12 BRPM | HEIGHT: 61 IN | TEMPERATURE: 98.3 F | BODY MASS INDEX: 30.34 KG/M2 | DIASTOLIC BLOOD PRESSURE: 50 MMHG | OXYGEN SATURATION: 98 % | HEART RATE: 76 BPM | SYSTOLIC BLOOD PRESSURE: 104 MMHG

## 2025-06-24 DIAGNOSIS — Q13.5 BLUE SCLERA OF BOTH EYES: ICD-10-CM

## 2025-06-24 DIAGNOSIS — H44.812 BLEEDING OF EYE, LEFT: ICD-10-CM

## 2025-06-24 DIAGNOSIS — H57.89 EYE IRRITATION: ICD-10-CM

## 2025-06-24 DIAGNOSIS — J30.9 ALLERGIC RHINITIS, UNSPECIFIED SEASONALITY, UNSPECIFIED TRIGGER: Primary | ICD-10-CM

## 2025-06-24 DIAGNOSIS — H57.89 BLOOD VESSEL PROBLEMS IN EYES: ICD-10-CM

## 2025-06-24 DIAGNOSIS — R73.09 OTHER ABNORMAL GLUCOSE: ICD-10-CM

## 2025-06-24 DIAGNOSIS — H60.543 DERMATITIS OF BOTH EAR CANALS: ICD-10-CM

## 2025-06-24 LAB
BASOPHILS # BLD AUTO: 0.1 10E3/UL (ref 0–0.2)
BASOPHILS NFR BLD AUTO: 1 %
EOSINOPHIL # BLD AUTO: 0.1 10E3/UL (ref 0–0.7)
EOSINOPHIL NFR BLD AUTO: 1 %
ERYTHROCYTE [DISTWIDTH] IN BLOOD BY AUTOMATED COUNT: 11.8 % (ref 10–15)
EST. AVERAGE GLUCOSE BLD GHB EST-MCNC: 134 MG/DL
HBA1C MFR BLD: 6.3 % (ref 0–5.6)
HCT VFR BLD AUTO: 38.3 % (ref 35–47)
HGB BLD-MCNC: 13 G/DL (ref 11.7–15.7)
IMM GRANULOCYTES # BLD: 0 10E3/UL
IMM GRANULOCYTES NFR BLD: 0 %
LYMPHOCYTES # BLD AUTO: 1.8 10E3/UL (ref 0.8–5.3)
LYMPHOCYTES NFR BLD AUTO: 18 %
MCH RBC QN AUTO: 30.7 PG (ref 26.5–33)
MCHC RBC AUTO-ENTMCNC: 33.9 G/DL (ref 31.5–36.5)
MCV RBC AUTO: 91 FL (ref 78–100)
MONOCYTES # BLD AUTO: 0.4 10E3/UL (ref 0–1.3)
MONOCYTES NFR BLD AUTO: 4 %
NEUTROPHILS # BLD AUTO: 7.9 10E3/UL (ref 1.6–8.3)
NEUTROPHILS NFR BLD AUTO: 76 %
PLATELET # BLD AUTO: 309 10E3/UL (ref 150–450)
RBC # BLD AUTO: 4.23 10E6/UL (ref 3.8–5.2)
WBC # BLD AUTO: 10.4 10E3/UL (ref 4–11)

## 2025-06-24 PROCEDURE — 3074F SYST BP LT 130 MM HG: CPT | Performed by: INTERNAL MEDICINE

## 2025-06-24 PROCEDURE — 99214 OFFICE O/P EST MOD 30 MIN: CPT | Performed by: INTERNAL MEDICINE

## 2025-06-24 PROCEDURE — 3078F DIAST BP <80 MM HG: CPT | Performed by: INTERNAL MEDICINE

## 2025-06-24 PROCEDURE — 86003 ALLG SPEC IGE CRUDE XTRC EA: CPT | Performed by: INTERNAL MEDICINE

## 2025-06-24 PROCEDURE — 1126F AMNT PAIN NOTED NONE PRSNT: CPT | Performed by: INTERNAL MEDICINE

## 2025-06-24 PROCEDURE — 36415 COLL VENOUS BLD VENIPUNCTURE: CPT | Performed by: INTERNAL MEDICINE

## 2025-06-24 PROCEDURE — 83036 HEMOGLOBIN GLYCOSYLATED A1C: CPT | Performed by: INTERNAL MEDICINE

## 2025-06-24 PROCEDURE — G2211 COMPLEX E/M VISIT ADD ON: HCPCS | Performed by: INTERNAL MEDICINE

## 2025-06-24 PROCEDURE — 85025 COMPLETE CBC W/AUTO DIFF WBC: CPT | Performed by: INTERNAL MEDICINE

## 2025-06-24 PROCEDURE — 82785 ASSAY OF IGE: CPT | Performed by: INTERNAL MEDICINE

## 2025-06-24 PROCEDURE — 3044F HG A1C LEVEL LT 7.0%: CPT | Performed by: INTERNAL MEDICINE

## 2025-06-24 ASSESSMENT — PATIENT HEALTH QUESTIONNAIRE - PHQ9
10. IF YOU CHECKED OFF ANY PROBLEMS, HOW DIFFICULT HAVE THESE PROBLEMS MADE IT FOR YOU TO DO YOUR WORK, TAKE CARE OF THINGS AT HOME, OR GET ALONG WITH OTHER PEOPLE: NOT DIFFICULT AT ALL
SUM OF ALL RESPONSES TO PHQ QUESTIONS 1-9: 4
SUM OF ALL RESPONSES TO PHQ QUESTIONS 1-9: 4

## 2025-06-24 ASSESSMENT — PAIN SCALES - GENERAL: PAINLEVEL_OUTOF10: NO PAIN (0)

## 2025-06-24 NOTE — LETTER
2025    Krystyna Willisg   2000        To Whom it May Concern;    Please excuse Krystyna Kennedy from work for a healthcare visit on 2025.    Sincerely,        Jimmie Amaro MD

## 2025-06-26 LAB
A ALTERNATA IGE QN: 1.03 KU(A)/L
A FUMIGATUS IGE QN: <0.1 KU(A)/L
BERMUDA GRASS IGE QN: 0.11 KU(A)/L
C HERBARUM IGE QN: <0.1 KU(A)/L
CAT DANDER IGG QN: 4.2 KU(A)/L
CEDAR IGE QN: <0.1 KU(A)/L
COMMON RAGWEED IGE QN: 42.1 KU(A)/L
COTTONWOOD IGE QN: 0.1 KU(A)/L
D FARINAE IGE QN: <0.1 KU(A)/L
D PTERONYSS IGE QN: <0.1 KU(A)/L
DOG DANDER+EPITH IGE QN: 1.93 KU(A)/L
IGE SERPL-ACNC: 562 KU/L (ref 0–114)
MAPLE IGE QN: 0.79 KU(A)/L
MARSH ELDER IGE QN: 1.52 KU(A)/L
MOUSE URINE PROT IGE QN: 6.46 KU(A)/L
NETTLE IGE QN: <0.1 KU(A)/L
P NOTATUM IGE QN: <0.1 KU(A)/L
ROACH IGE QN: <0.1 KU(A)/L
SALTWORT IGE QN: <0.1 KU(A)/L
SILVER BIRCH IGE QN: 6.28 KU(A)/L
TIMOTHY IGE QN: <0.1 KU(A)/L
WHITE ASH IGE QN: <0.1 KU(A)/L
WHITE ELM IGE QN: 0.13 KU(A)/L
WHITE MULBERRY IGE QN: <0.1 KU(A)/L
WHITE OAK IGE QN: 0.59 KU(A)/L

## 2025-06-26 NOTE — PROGRESS NOTES
[address] [address]  Phone: [phone]  Fax: [phone]    The practice of medicine is an art, not a trade; a calling, not a business; a calling in which your heart will be exercised equally with your head.  - Dr. William Osler  ______________________________________________________________________     Date of Service: 6/24/2025  Primary Provider: Erik Jimenez    Patient Care Team:  Erik Jimenez MD as PCP - General (Family Medicine)  Tabitha Pineda DO as Assigned PCP     ______________________________________________________________________     Chief Complaint   Patient presents with    Eye Problem     Watery eyes ongoing since 2019. Patient was recommended to use artificial eye drop which doesn't seem it be helping.      Ear Problem     Patient is having itching in both of the ears. When patient scratch the ears drainage came out.           6/24/2025     2:53 PM   Additional Questions   Roomed by Edward Gonzalez   Accompanied by Self     History of Present Illness-  Krystyna Benavides, 24 years    Eye symptoms  - Noticed change in the color of the whites of the eyes, with visible red veins  - Reports dark circular spots inside the eyeball, particularly on the left side  - Eyes are watery with sticky discharge, sometimes blocking vision  - Blood observed coming from the corner of the left eye, first noticed about a month ago and again last week  - No pain reported in the eyes  - Itching occurs when around dogs, leading to avoidance of contact  - Artificial tears have been used, providing some relief from watery eyes    Ear symptoms  - Previously used ointment for ear issues, which helped initially  - Inside of the ear becomes itchy, leading to scratching and liquid discharge  - Symptoms have spread to the outside of the ear  - No scalp rash or psoriasis reported  - No history of eczema    Headache  - Experienced headache starting  "yesterday night and continuing today at work    Nasal symptoms  - Occasional itchiness in the nose, possibly due to dust at work  - Feels like something is inside the nose, relieved by blowing it out    Social and environmental factors  - Has two dogs that shed, potentially contributing to allergy symptoms  - Reports seasonal allergies, particularly in the fall    Misc  - Mentioned borderline blood sugar levels and possible early diabetes about three years ago  ______________________________________________________________________     Active Problem List:  Problem List as of 6/24/2025 Reviewed: 3/12/2024  5:30 PM by Tabitha Pineda DO         Medium    Major depressive disorder with current active episode, unspecified depression episode severity, unspecified whether recurrent     No current outpatient medications  Social History     Social History Narrative    Not on file      ______________________________________________________________________     Wt Readings from Last 3 Encounters:   06/24/25 72.9 kg (160 lb 11.2 oz)   03/12/24 74.8 kg (164 lb 12.8 oz)   11/16/22 76 kg (167 lb 8 oz)     BP Readings from Last 3 Encounters:   06/24/25 104/50   03/12/24 110/60   11/16/22 104/70     /50   Pulse 76   Temp 98.3  F (36.8  C) (Oral)   Resp 12   Ht 1.537 m (5' 0.5\")   Wt 72.9 kg (160 lb 11.2 oz)   LMP 05/17/2025 (Exact Date)   SpO2 98%   BMI 30.87 kg/m     The patient is comfortable, no acute distress.  Mood good.  Insight good.  Eyes are nonicteric.  Sclera are blue.  No tear duct abnormality noted on external exam.  Nose shows bluish rhinitis which is moderate .Neck is supple without mass.  No cervical adenopathy.  No thyromegaly. Heart regular rate and rhythm.  Lungs clear to auscultation bilaterally.  Respiratory effort is good.  Extremities no edema.  ______________________________________________________________________     Results for orders placed or performed in visit on 06/24/25 "   Hemoglobin A1c     Status: Abnormal   Result Value Ref Range    Estimated Average Glucose 134 (H) <117 mg/dL    Hemoglobin A1C 6.3 (H) 0.0 - 5.6 %   CBC with platelets and differential     Status: None   Result Value Ref Range    WBC Count 10.4 4.0 - 11.0 10e3/uL    RBC Count 4.23 3.80 - 5.20 10e6/uL    Hemoglobin 13.0 11.7 - 15.7 g/dL    Hematocrit 38.3 35.0 - 47.0 %    MCV 91 78 - 100 fL    MCH 30.7 26.5 - 33.0 pg    MCHC 33.9 31.5 - 36.5 g/dL    RDW 11.8 10.0 - 15.0 %    Platelet Count 309 150 - 450 10e3/uL    % Neutrophils 76 %    % Lymphocytes 18 %    % Monocytes 4 %    % Eosinophils 1 %    % Basophils 1 %    % Immature Granulocytes 0 %    Absolute Neutrophils 7.9 1.6 - 8.3 10e3/uL    Absolute Lymphocytes 1.8 0.8 - 5.3 10e3/uL    Absolute Monocytes 0.4 0.0 - 1.3 10e3/uL    Absolute Eosinophils 0.1 0.0 - 0.7 10e3/uL    Absolute Basophils 0.1 0.0 - 0.2 10e3/uL    Absolute Immature Granulocytes 0.0 <=0.4 10e3/uL   CBC with Platelets & Differential     Status: None    Narrative    The following orders were created for panel order CBC with Platelets & Differential.  Procedure                               Abnormality         Status                     ---------                               -----------         ------                     CBC with platelets and ...[[id number]]                      Final result                 Please view results for these tests on the individual orders.      ______________________________________________________________________     Diagnoses managed today:    1. Allergic rhinitis, unspecified seasonality, unspecified trigger    2. Other abnormal glucose    3. Blue sclera of both eyes    4. Blood vessel problems in eyes    5. Bleeding of eye, left    6. Eye irritation    7. Dermatitis of both ear canals       ______________________________________________________________________     Assessment & Plan  Allergic rhinitis:  - Possible nasal allergies contributing to eye symptoms.  Sensitivity to dogs noted.  - Consider using Flonase for nasal allergies. Allergy testing recommended to identify triggers.    Other abnormal glucose:  - History of borderline blood sugar levels, possible early diabetes.  - Blood work recommended to check glucose levels.    Eye irritation:  - Watery and gooey eyes possibly related to allergies.  - Continue using artificial tears and Lumify drops. Consider allergy testing.    Dermatitis of both ear canals:  - Seborrheic dermatitis causing ear irritation.  - Use dandruff shampoo like Selsun Blue or Head & Shoulders in ear canals. Consider hydrocortisone cream over the counter. Prescription cream refill offered.    Blue sclera of both eyes:  - Noted pigmentation changes in the sclera, possibly related to high myopia or other cause.  - Monitor for any changes or worsening symptoms.    Blood vessel problems in eyes:  - Possible broken blood vessel in the tear duct area causing bleeding.  - Monitor for recurrence and consider ophthalmologist referral if symptoms persist.    Bleeding of eye, left:  - Blood observed in the left eye corner, possibly due to pressure or irritation.  - Monitor for recurrence and consider ophthalmologist referral if symptoms persist.     Continue current medications otherwise.  Follow up sooner if issues.    Orders Placed This Encounter   Procedures    [facility] Resp Allergen Panel    Hemoglobin A1c    CBC with platelets and differential    CBC with Platelets & Differential      Issues to follow up on:  Follow up blood work.    Jimmie Amaro MD  General Internal Medicine  [facility]    The longitudinal plan of care for the diagnoses and conditions as documented were addressed during this visit. Due to the added complexity in care, I will continue to support Krystyna in the subsequent management and with ongoing continuity of care.     No follow-ups on file.     No future appointments.

## 2025-06-27 PROBLEM — Z88.9 MULTIPLE ALLERGIES: Status: ACTIVE | Noted: 2025-06-27

## 2025-06-30 ENCOUNTER — PATIENT OUTREACH (OUTPATIENT)
Dept: CARE COORDINATION | Facility: CLINIC | Age: 25
End: 2025-06-30
Payer: COMMERCIAL

## 2025-07-02 ENCOUNTER — PATIENT OUTREACH (OUTPATIENT)
Dept: CARE COORDINATION | Facility: CLINIC | Age: 25
End: 2025-07-02
Payer: COMMERCIAL

## 2025-07-12 ENCOUNTER — HEALTH MAINTENANCE LETTER (OUTPATIENT)
Age: 25
End: 2025-07-12